# Patient Record
Sex: MALE | Race: WHITE | Employment: FULL TIME | URBAN - METROPOLITAN AREA
[De-identification: names, ages, dates, MRNs, and addresses within clinical notes are randomized per-mention and may not be internally consistent; named-entity substitution may affect disease eponyms.]

---

## 2020-04-19 ENCOUNTER — VIRTUAL VISIT (OUTPATIENT)
Dept: FAMILY MEDICINE | Facility: OTHER | Age: 50
End: 2020-04-19

## 2020-04-19 NOTE — PROGRESS NOTES
"Date: 2020 16:45:42  Clinician: Akanksha Robert  Clinician NPI: [id number]  Patient: Mikel Elias  Patient : 1970  Patient Address: [address] [place] [address]  Patient Phone: [phone]  Visit Protocol: URI  Patient Summary:  Mikel is a 50 year old ( : 1970 ) male who initiated a Visit for COVID-19 (Coronavirus) evaluation and screening. When asked the question \"Please sign me up to receive news, health information and promotions from [employer].\", Mikel responded \"No\".    Mikel states his symptoms started gradually 1 month or more ago. After his symptoms started, they improved and then got worse again.   His symptoms consist of a sore throat, a cough, malaise, myalgia, nausea, a headache, and wheezing. He is experiencing mild difficulty breathing with activities but can speak normally in full sentences. Mikel also feels feverish but was unable to measure his temperature.   Symptom details     Cough: Mikel coughs a few times an hour and his cough is not more bothersome at night. Phlegm does not come into his throat when he coughs. He does not believe his cough is caused by post-nasal drip.     Sore throat: Mikel reports having mild throat pain (1-3 on a 10 point pain scale), does not have exudate on his tonsils, and can swallow liquids. He is not sure if the lymph nodes in his neck are enlarged. A rash has not appeared on the skin since the sore throat started.     Wheezing: Mikel has not ever been diagnosed with asthma. The wheezing does not interfere with his normal daily activities.    Headache: Mikel has not had the headache for 12 weeks or more. He states the headache is mild (1-3 on a 10 point pain scale).      Mikel denies having rhinitis, chills, diarrhea, facial pain or pressure, nasal congestion, ear pain, vomiting, teeth pain, anosmia, and ageusia. He also denies taking antibiotic medication for the symptoms and having recent facial or sinus " surgery in the past 60 days.   Precipitating events  Within the past week, Mikel has not been exposed to someone with strep throat. He has not recently been exposed to someone with influenza. Mikel has been in close contact with the following high risk individuals: children under the age of 5 and people with asthma, heart disease or diabetes.   Pertinent COVID-19 (Coronavirus) information  Mikel has not traveled internationally or to the areas where COVID-19 (Coronavirus) is widespread, including cruise ship travel in the last 14 days before the start of his symptoms.   Mikel does not work or volunteer as healthcare worker or a  and does not work or volunteer in a healthcare facility.   He does not live with a healthcare worker.   Mikel has not had a close contact with a laboratory-confirmed COVID-19 patient within 14 days of symptom onset. He has had a close contact with a suspected COVID-19 patient within 14 days of symptom onset. Additional information about contact with COVID-19 (Coronavirus) patient as reported by the patient (free text): Coworkers with symptoms that didn't get tested so they wouldn't get quarantined   Pertinent medical history  Mikel had 1 sinus infection within the past year.   Mikel does not need a return to work/school note.   Weight: 140 lbs   Mikel does not smoke or use smokeless tobacco.   Weight: 140 lbs    MEDICATIONS: No current medications, ALLERGIES: NKDA  Clinician Response:  Dear Mikel,   Dear Mikel  Your symptoms show that you may have coronavirus (COVID-19). This illness can cause fever, cough and trouble breathing. Many people get a mild case and get better on their own. Some people can get very sick.  Will I be tested for COVID-19?  Because the virus is spreading, we are no longer testing most patients. You may request testing if:   You are very ill. For example, you're on chemotherapy, dialysis or home hospice care. (Contact your specialty  clinic or program.)   You live in a nursing home or other long-term care facility. (Talk to your nurse manager or medical director.)   You're a health care worker. (Contact your employee health office.)   How can I protect others?  Without a test, we can't know for sure that you have COVID-19. For safety, it's very important to follow these rules.  First, stay home and away from others (self-isolate) until:   You've had no fever---and no medicine that reduces fever---for 3 full days (72 hours). And...    Your other symptoms have gotten better. For example, your cough or breathing has improved. And...   At least 7 days have passed since your symptoms started.   During this time:   Don't go to work, school or anywhere else.    Stay away from others in your home. No hugging, kissing or shaking hands.   Don't let anyone visit.   Cover your mouth and nose with a mask, tissue or wash cloth to avoid spreading germs.   Wash your hands and face often. Use soap and water.   How can I take care of myself?   1.Take Tylenol (acetaminophen) for fever or pain. If you have liver or kidney problems, ask your family doctor if it's okay to take Tylenol.        Adults can take either:    650 mg (two 325 mg pills) every 4 to 6 hours, or...   1,000 mg (two 500 mg pills) every 8 hours as needed.    Note: Don't take more than 3,000 mg in one day.   For children, check the Tylenol bottle for the right dose. The dose is based on the child's age or weight.   2.If you have other health problems (like cancer, heart failure, an organ transplant or severe kidney disease): Call your specialty clinic if you don't feel better in the next 2 days.       3.Know when to call 911: If your breathing is so bad that it keeps you from doing normal activities, call 911 or go to the emergency room. Tell them that you've been staying home and may have COVID-19.       4.Sign up for [facility]. We know it's scary to hear that you might have COVID-19. We want to  track your symptoms to make sure you're okay over the next 2 weeks. Please look for an email from [employer]---this is a free, online program that we'll use to keep in touch. To sign up, follow the link in the email. Learn more at [email or web address].   Where can I get more information?  To learn more about COVID-19 and how to care for yourself at home, please visit the CDC website at https://www.cdc.gov/coronavirus/2019-ncov/about/steps-when-sick.html.  For more options for care at [facility], please visit our website at [email or web address]     Diagnosis: Cough  Diagnosis ICD: R05  Prescription: albuterol sulfate (Proventil HFA) 90 mcg/actuation inhalation HFA aerosol inhaler 1 200 inhalation aerosol with adapter (proventil hfa or equivalent), 14 days supply. Inhale 2 puffs every 4 hours for 14 days as needed. Refills: 0, Refill as needed: no, Allow substitutions: yes  Prescription: benzonatate (Tessalon Perles) 100 mg oral capsule 15 capsule, 5 days supply. Take 1 capsule by mouth 3 times per day for 5 days as needed. Refills: 0, Refill as needed: no, Allow substitutions: yes

## 2020-04-23 ENCOUNTER — TELEPHONE (OUTPATIENT)
Dept: FAMILY MEDICINE | Facility: CLINIC | Age: 50
End: 2020-04-23

## 2020-04-23 ENCOUNTER — VIRTUAL VISIT (OUTPATIENT)
Dept: URGENT CARE | Facility: CLINIC | Age: 50
End: 2020-04-23
Payer: COMMERCIAL

## 2020-04-23 VITALS — WEIGHT: 140 LBS

## 2020-04-23 DIAGNOSIS — R35.89 POLYURIA: ICD-10-CM

## 2020-04-23 DIAGNOSIS — Z20.822 SUSPECTED COVID-19 VIRUS INFECTION: Primary | ICD-10-CM

## 2020-04-23 PROBLEM — J30.2 SEASONAL ALLERGIC RHINITIS: Status: ACTIVE | Noted: 2020-04-23

## 2020-04-23 PROCEDURE — 99203 OFFICE O/P NEW LOW 30 MIN: CPT | Mod: 95 | Performed by: STUDENT IN AN ORGANIZED HEALTH CARE EDUCATION/TRAINING PROGRAM

## 2020-04-23 NOTE — PROGRESS NOTES
"The patient has been notified of following:     \"This telephone visit will be conducted via a call between you and your physician/provider. We have found that certain health care needs can be provided without the need for a physical exam.  This service lets us provide the care you need with a short phone conversation.  If a prescription is necessary we can send it directly to your pharmacy.      Telephone visits are billed at different rates depending on your insurance coverage. During this emergency period, for some insurers they may be billed the same as an in-person visit.  Please reach out to your insurance provider with any questions.    If during the course of the call the physician/provider feels a telephone visit is not appropriate, you will not be charged for this service.\"    Patient has given verbal consent for Telephone visit?  Yes      Subjective     CC: Mikel Elias  is a 50 year old male who presents to clinic today for the following health issues:   Chief Complaint   Patient presents with     Cough          History:  New symptom: lungs hurt. Started 5 weeks ago, started getting better. Then started getting \"a pain in my heart\", got very severe when he tried to ride his exercise bike for 10 s 4 weeks prior, took 2 days for that pain to go away. Feels a little winded with walking up stairs, which is new for him, but no chest pain. Has been the same for the last 3 weeks, but better than at symptom onset.  Now 5-6 weeks since symptom onset.  Had subjective fevers- resolved 3-4 weeks ago.  Interested in getting tested- either acute infection or the antibody testing. Would be interested in being in a clinical trial if possible.  Cough has been nonproductive without mucus. Cough had gone away, but now is back.  Still nauseous, especially in the AM, better with drinking water.  Initially had HA, myalgias, nausea.  Myalgias haven't gone away, just keep changing location.  Usually very active, does yoga, " works construction. Not usual to get aches.  Denies anosmia or lack of taste.  Is not taking any medication currently.    Has been back at work for the next 3 weeks.    Patient Active Problem List   Diagnosis     Seasonal allergic rhinitis     No current outpatient medications on file.     No current facility-administered medications for this visit.       Allergies no known allergies    Reviewed and updated as needed this visit by Provider    Review of Systems   Patient does endorse significant dry mouth and urinary frequency (10 or so per day) that did not resolve after marijuana cessation. Is worried this could be T2DM      Objective    Gen: Patient is alert, oriented  Resp: Speaking full sentence, no audible shortness of breath.  No cough of wheeze.    Wt 63.5 kg (140 lb)           Assessment/Plan:  Mikel was seen today for cough.    Diagnoses and all orders for this visit:    Suspected Covid-19 Virus Infection  -     COVID-19 GetWell Loop Referral; Future  Reassured patient tolerating activity reasonably well now, no concern for worsening respiratory status. No CAD hx to be concerned for undetected MI. Symptoms do seem to align with suspected prior COVID-19 infection, counseled patient it can take weeks before he feels completely back to normal.  Enrolled patient in [employer] for most up to date information.      Polyuria  Advised patient to establish with a primary clinic remotely for further investigation and possible labs to r/o T2DM, UTI, monitor lytes, etc. He will call a [facility] clinic close to him. If they are not taking new patients remotely, advised patient to call [facility].      Phone call duration:  30 minutes    DO [facility] Resident PGY-2  [phone]    04/23/204:08 PM    I was present during the key/critical portion of the telephone visit. The patient acknowledged that I participated in the telephone conversation. I discussed the case with the resident  and agree with the note as documented by the resident.    I personally spent a total of 3 minutes speaking with Mikel Elias during today s visit.     Bill Coronel MD  4/24/2020 at 11:38 AM

## 2020-04-23 NOTE — TELEPHONE ENCOUNTER
Patient stated, I scheduled phone visit on my own and still having cough and symptoms.     I am still having   Chest pain- harder to breath- someone standing on my chest for 6 weeks.- not worse than when he started  Cough   Not sure about fever- I do not feel like I have a fever     Per protocol and huddle- scheduled patient today for Virtual Urgent care with a urgent care provider. Phone  Visit with Bk august.     Evy Mcdonough RN on 4/23/2020 at 3:59 PM

## 2020-04-23 NOTE — PATIENT INSTRUCTIONS
1. Suspected COVID-19 Infection  I think you are getting better, but it will probably take weeks until you feel back to normal. Go easy on yourself.  Check your email to enroll in the [employer] service- it provides daily check ins and up to date information. That will likely be the quickest way to get accurate current info about what testing is available. As of right now, I can't offer acute infection testing to you, and I don't have antibody testing available.  As for trials, I checked our database and there are 4 trials currently, none of which you meet criteria for. They are only for 1) healthcare workers, 2) people with a prior positive lab test for COVID-19 3) people recently exposed to a positive COVID-19 patient, or 4) you are hospitalized and have symptoms suspicious for COVID-19.    2. Urinary frequency  Check the clinics near you by calling and see if you can establish care remotely for a PCP.  If none of them are available for that, call my home clinic to establish: [facility] [phone] at [address] [facility] [address]. We're doing phone and video visits with new patients and could order blood and urine tests from a lab close to you.  Based on your weight, I'm not highly suspicious for diabetes, but we should check some things out.    Take care,  Jacob Ramsay, DO

## 2020-04-23 NOTE — TELEPHONE ENCOUNTER
Patient has a telephone visit scheduled with Bk tomorrow, please triage for symptoms and find out if oncare wanted him seen in person or if a phone visit is okay or if this was patient request for appt? - per Bk.    Grecia Flores MA

## 2020-04-24 ENCOUNTER — OFFICE VISIT (OUTPATIENT)
Dept: FAMILY MEDICINE | Facility: CLINIC | Age: 50
End: 2020-04-24
Payer: COMMERCIAL

## 2020-04-24 VITALS
RESPIRATION RATE: 18 BRPM | TEMPERATURE: 98.5 F | DIASTOLIC BLOOD PRESSURE: 80 MMHG | OXYGEN SATURATION: 97 % | SYSTOLIC BLOOD PRESSURE: 120 MMHG | HEART RATE: 70 BPM

## 2020-04-24 DIAGNOSIS — J45.20 INTERMITTENT ASTHMA, UNSPECIFIED ASTHMA SEVERITY, UNSPECIFIED WHETHER COMPLICATED: ICD-10-CM

## 2020-04-24 DIAGNOSIS — Z20.822 SUSPECTED COVID-19 VIRUS INFECTION: Primary | ICD-10-CM

## 2020-04-24 PROCEDURE — 99204 OFFICE O/P NEW MOD 45 MIN: CPT | Performed by: NURSE PRACTITIONER

## 2020-04-24 RX ORDER — FLUTICASONE PROPIONATE AND SALMETEROL XINAFOATE 45; 21 UG/1; UG/1
2 AEROSOL, METERED RESPIRATORY (INHALATION) 2 TIMES DAILY
Qty: 1 INHALER | Refills: 1 | Status: SHIPPED | OUTPATIENT
Start: 2020-04-24 | End: 2020-07-01

## 2020-04-24 RX ORDER — ALBUTEROL SULFATE 90 UG/1
2 AEROSOL, METERED RESPIRATORY (INHALATION) EVERY 6 HOURS
Qty: 1 INHALER | Refills: 1 | Status: SHIPPED | OUTPATIENT
Start: 2020-04-24 | End: 2021-12-31

## 2020-04-24 NOTE — PROGRESS NOTES
Subjective     Mikel Elias is a 50 year old male who presents to clinic today for the following health issues:  PPE NOTE:  Prior to the patient entering the clinic he was provided with a surgical mask, he was escorted by an MA directly to room #1 from the clinic entrance (the MA was wearing an isolation mask).During the visit he provider wore full PPE including gown, glove, isolation mask, face shield. The patient did not go to any other areas of the clinic during the visit.  At the end of the visit the patient wore his mask and was escorted out of room #1 to the door.   HPI   Cough:    Chest Pain  Patient presents with ongoing cough. He had an oncare visit and telephone visit with urgent care triage yesterday.  He continues to have cough and shortness of breath.  He has had problems with shortness of breath since he was very sick in March. In March he had a fever and a severe cough.  Since that time he continues to have intermittent cough with shortness of breath with activity, cough is productive of white sputum.  He is concerned that he had COVID and his lungs are severely damaged.    Upon further discussion he reports that the intermittent episodes of coughing started 2 years ago and has used an albuterol inhaler in the past, the episode of coughing with wheeze was very bad in March and does not seem to be resolving.   Quit smoking cigarettes in 2009  Quit smoking mariguana in 3/2020.      Patient Active Problem List   Diagnosis     Seasonal allergic rhinitis     No past surgical history on file.    Social History     Tobacco Use     Smoking status: Former Smoker     Last attempt to quit: 2010     Years since quitting: 10.3     Smokeless tobacco: Never Used   Substance Use Topics     Alcohol use: Yes     Comment: daily cocktail after dinner     Family History   Problem Relation Age of Onset     Diabetes No family hx of          No current outpatient medications on file.     No Known Allergies    Reviewed and  updated as needed this visit by Provider         Review of Systems   ROS COMP: CONSTITUTIONAL: NEGATIVE for fever, chills, change in weight  ENT/MOUTH: NEGATIVE for ear, mouth and throat problems  RESP: see HPI  CV: NEGATIVE for chest pain, palpitations or peripheral edema  PSYCHIATRIC: NEGATIVE for changes in mood or affect      Objective    /80 (BP Location: Right arm, Patient Position: Chair, Cuff Size: Adult Regular)   Pulse 70   Temp 98.5  F (36.9  C)   Resp 18   SpO2 97%   There is no height or weight on file to calculate BMI.  Physical Exam   GENERAL: healthy, alert and no distress  HENT: ear canals and TM's normal, nose and mouth without ulcers or lesions  NECK: no adenopathy, no asymmetry, masses, or scars and thyroid normal to palpation  RESP: lungs clear to auscultation - no rales, rhonchi or wheezes, oxygen sat 97%, infrequent dry cough during exam.  CV: regular rate and rhythm, normal S1 S2,   NEURO:  mentation intact and speech normal  PSYCH: mentation appears normal, affect normal/bright          Assessment & Plan   Assessment  Mikel was seen today for cough.    Diagnoses and all orders for this visit:    Suspected Covid-19 Virus Infection:  Patient may have had COVID in March when had fever with more severe cough. Likely now is experiencing residual cough.      Intermittent asthma, unspecified asthma severity, unspecified whether complicated:  With intermittent cough for several years and history of smoking may have component of asthma, COPD. Will start on Advair bid , discussed taking daily, may use albuterol on an as needed basis (has had this prescribed in the past).  Also suggested OTC zyrtec.  -     fluticasone-salmeterol (ADVAIR-HFA) 45-21 MCG/ACT inhaler; Inhale 2 puffs into the lungs 2 times daily  -     albuterol (PROAIR HFA/PROVENTIL HFA/VENTOLIN HFA) 108 (90 Base) MCG/ACT inhaler; Inhale 2 puffs into the lungs every 6 hours    Follow up in 7/2020 for physical exam and recheck  of asthma, sooner as needed.      Susan Haase, APRN [facility]

## 2020-06-30 DIAGNOSIS — J45.20 INTERMITTENT ASTHMA, UNSPECIFIED ASTHMA SEVERITY, UNSPECIFIED WHETHER COMPLICATED: ICD-10-CM

## 2020-07-01 ENCOUNTER — VIRTUAL VISIT (OUTPATIENT)
Dept: FAMILY MEDICINE | Facility: CLINIC | Age: 50
End: 2020-07-01
Payer: COMMERCIAL

## 2020-07-01 ENCOUNTER — TELEPHONE (OUTPATIENT)
Dept: FAMILY MEDICINE | Facility: CLINIC | Age: 50
End: 2020-07-01

## 2020-07-01 DIAGNOSIS — Z20.822 ENCOUNTER FOR LABORATORY TESTING FOR COVID-19 VIRUS: ICD-10-CM

## 2020-07-01 DIAGNOSIS — J45.40 MODERATE PERSISTENT ASTHMA WITHOUT COMPLICATION: Primary | ICD-10-CM

## 2020-07-01 PROBLEM — J45.20 INTERMITTENT ASTHMA, UNSPECIFIED ASTHMA SEVERITY, UNSPECIFIED WHETHER COMPLICATED: Status: ACTIVE | Noted: 2020-07-01

## 2020-07-01 PROCEDURE — 99213 OFFICE O/P EST LOW 20 MIN: CPT | Mod: 95 | Performed by: NURSE PRACTITIONER

## 2020-07-01 RX ORDER — FLUTICASONE PROPIONATE AND SALMETEROL XINAFOATE 45; 21 UG/1; UG/1
AEROSOL, METERED RESPIRATORY (INHALATION)
Qty: 1 INHALER | Refills: 1 | OUTPATIENT
Start: 2020-07-01

## 2020-07-01 RX ORDER — FLUTICASONE PROPIONATE AND SALMETEROL XINAFOATE 45; 21 UG/1; UG/1
2 AEROSOL, METERED RESPIRATORY (INHALATION) 2 TIMES DAILY
Qty: 1 INHALER | Refills: 5 | Status: SHIPPED | OUTPATIENT
Start: 2020-07-01 | End: 2020-12-17

## 2020-07-01 ASSESSMENT — ASTHMA QUESTIONNAIRES
QUESTION_2 LAST FOUR WEEKS HOW OFTEN HAVE YOU HAD SHORTNESS OF BREATH: ONCE OR TWICE A WEEK
QUESTION_3 LAST FOUR WEEKS HOW OFTEN DID YOUR ASTHMA SYMPTOMS (WHEEZING, COUGHING, SHORTNESS OF BREATH, CHEST TIGHTNESS OR PAIN) WAKE YOU UP AT NIGHT OR EARLIER THAN USUAL IN THE MORNING: NOT AT ALL
QUESTION_4 LAST FOUR WEEKS HOW OFTEN HAVE YOU USED YOUR RESCUE INHALER OR NEBULIZER MEDICATION (SUCH AS ALBUTEROL): NOT AT ALL
QUESTION_1 LAST FOUR WEEKS HOW MUCH OF THE TIME DID YOUR ASTHMA KEEP YOU FROM GETTING AS MUCH DONE AT WORK, SCHOOL OR AT HOME: NONE OF THE TIME
ACT_TOTALSCORE: 23
QUESTION_5 LAST FOUR WEEKS HOW WOULD YOU RATE YOUR ASTHMA CONTROL: WELL CONTROLLED

## 2020-07-01 ASSESSMENT — ANXIETY QUESTIONNAIRES
2. NOT BEING ABLE TO STOP OR CONTROL WORRYING: NEARLY EVERY DAY
GAD7 TOTAL SCORE: 5
3. WORRYING TOO MUCH ABOUT DIFFERENT THINGS: NOT AT ALL
7. FEELING AFRAID AS IF SOMETHING AWFUL MIGHT HAPPEN: NOT AT ALL
1. FEELING NERVOUS, ANXIOUS, OR ON EDGE: NOT AT ALL
5. BEING SO RESTLESS THAT IT IS HARD TO SIT STILL: SEVERAL DAYS
6. BECOMING EASILY ANNOYED OR IRRITABLE: SEVERAL DAYS
IF YOU CHECKED OFF ANY PROBLEMS ON THIS QUESTIONNAIRE, HOW DIFFICULT HAVE THESE PROBLEMS MADE IT FOR YOU TO DO YOUR WORK, TAKE CARE OF THINGS AT HOME, OR GET ALONG WITH OTHER PEOPLE: NOT DIFFICULT AT ALL

## 2020-07-01 ASSESSMENT — PATIENT HEALTH QUESTIONNAIRE - PHQ9
5. POOR APPETITE OR OVEREATING: NOT AT ALL
SUM OF ALL RESPONSES TO PHQ QUESTIONS 1-9: 11

## 2020-07-01 NOTE — TELEPHONE ENCOUNTER
Pt is scheduled for a video visit this afternoon for refill on his Advair.  States his sx have improved.  Is interested in serology testing for COVID19.  Is not active on [employer] so ACT sent via mail.  Shireen Riley, CMA

## 2020-07-01 NOTE — LETTER
[facility]  [id number] [place] [address] [phone]  July 1, 2020    Mikel Elias  [address] [place] [address]    Dear Mikel,    I care about your health and have reviewed your health plan. I have reviewed your medical conditions, medication list, and lab results and am making recommendations based on this review, to better manage your health.    You are in particular need of attention regarding:  -Asthma  -Colon Cancer Screening    I am recommending that you:  {recommendations:-schedule a COLONOSCOPY to look for colon cancer (due every 10 years or 5 years in higher risk situations.)   Colon cancer is now the second leading cause of death in the [place] for both men and women and there are over 130,000 new cases and 50,000 deaths per year from colon cancer.  Colonoscopies can prevent 90-95% of these deaths.  Problem lesions can be removed before they ever become cancer.  This test is not only looking for cancer, but also getting rid of precancerious lesions.  If you do not wish to do a colonoscopy or cannot afford to do one, at this time, there is another option. It is called a FIT test or Fecal Immunochemical Occult Blood Test (take home stool sample kit).  It does not replace the colonoscopy for colorectal cancer screening, but it can detect hidden bleeding in the lower colon.  It does need to be repeated every year and if a positive result is obtained, you would be referred for a colonoscopy.  If you have completed either one of these tests at another facility, please have the records sent to our clinic so that we can best coordinate your care.   -Complete and return the attached ASTHMA CONTROL TEST.  If your total score is 19 or less or you have been to the ER or urgent care for your asthma, then please schedule an asthma followup appointment.    Here is a list of Health Maintenance topics that are due now or due  soon:  Health Maintenance Due   Topic Date Due     PREVENTIVE CARE VISIT  1970     ANNUAL REVIEW OF HM ORDERS  1970     ASTHMA CONTROL TEST  1970     COLORECTAL CANCER SCREENING  02/20/1980     HIV SCREENING  02/20/1985     DTAP/TDAP/TD IMMUNIZATION (1 - Tdap) 02/20/1995     LIPID  02/20/2005     PHQ-2  01/01/2020     ZOSTER IMMUNIZATION (1 of 2) 02/20/2020       Please call us at [phone] (or use [employer]) to address the above recommendations.     Thank you for trusting [facility] and we appreciate the opportunity to serve you.  We look forward to supporting your healthcare needs in the future.    Healthy Regards,    Susan Haase CNP

## 2020-07-01 NOTE — TELEPHONE ENCOUNTER
Please call and ask Pérez to set up a video visit with me to check on his cough/breathing status. Also to see if he would now like COVID testing. If he does want an appt see if you can get him an ACT to complete though [employer].  Thanks,  Susan Haase, CNP

## 2020-07-01 NOTE — TELEPHONE ENCOUNTER
Panel Management Review      Patient has the following on his problem list: None      Composite cancer screening  Chart review shows that this patient is due/due soon for the following Colonoscopy  Summary:    Patient is due/failing the following:   AAP, ACT and COLONOSCOPY    Action needed:   Patient needs to do ACT.    Type of outreach:    Copy of ACT mailed to patient, will reach out in 5 days.    Questions for provider review:    None                                                                                                                                    Shireen Riley CMA       Chart routed to Care Team .

## 2020-07-01 NOTE — PROGRESS NOTES
"Mikel Elias is a 50 year old male who is being evaluated via a billable telephone visit.      The patient has been notified of following:     \"This telephone visit will be conducted via a call between you and your physician/provider. We have found that certain health care needs can be provided without the need for a physical exam.  This service lets us provide the care you need with a short phone conversation.  If a prescription is necessary we can send it directly to your pharmacy.  If lab work is needed we can place an order for that and you can then stop by our lab to have the test done at a later time.    Telephone visits are billed at different rates depending on your insurance coverage. During this emergency period, for some insurers they may be billed the same as an in-person visit.  Please reach out to your insurance provider with any questions.    If during the course of the call the physician/provider feels a telephone visit is not appropriate, you will not be charged for this service.\"    Patient has given verbal consent for Telephone visit?  Yes    What phone number would you like to be contacted at? [phone]    How would you like to obtain your AVS? Mail a copy    Subjective     Mikel Elias is a 50 year old male who presents via phone visit today for the following health issues:    HPI  Asthma Follow-Up    Was ACT completed today?  Yes    ACT Total Scores 7/1/2020   ACT TOTAL SCORE (Goal Greater than or Equal to 20) 23   In the past 12 months, how many times did you visit the emergency room for your asthma without being admitted to the hospital? 0   In the past 12 months, how many times were you hospitalized overnight because of your asthma? 0       How many days per week do you miss taking your asthma controller medication?  1    Please describe any recent triggers for your asthma: pollens, humidity and exercise or sports    Have you had any Emergency Room Visits, Urgent Care Visits, or " Hospital Admissions since your last office visit?  No      How many servings of fruits and vegetables do you eat daily?  2-3    On average, how many sweetened beverages do you drink each day (Examples: soda, juice, sweet tea, etc.  Do NOT count diet or artificially sweetened beverages)?   1    How many days per week do you exercise enough to make your heart beat faster? 7    How many minutes a day do you exercise enough to make your heart beat faster? 60 or more  How many days per week do you miss taking your medication? 1    What makes it hard for you to take your medications?      Last visit 4/24/2020, prescribed Advair 2 puffs bid and albuterol inhaler.    Taking benadryl at night as needed.     PHQ 9 score of 11, FAHEEM 7 score of  5.  Denies thoughts of harming self or others.     Patient Active Problem List   Diagnosis     Seasonal allergic rhinitis     No past surgical history on file.    Social History     Tobacco Use     Smoking status: Former Smoker     Last attempt to quit: 2010     Years since quitting: 10.5     Smokeless tobacco: Never Used   Substance Use Topics     Alcohol use: Yes     Comment: couple nights per week      Family History   Problem Relation Age of Onset     Diabetes No family hx of          Current Outpatient Medications   Medication Sig Dispense Refill     albuterol (PROAIR HFA/PROVENTIL HFA/VENTOLIN HFA) 108 (90 Base) MCG/ACT inhaler Inhale 2 puffs into the lungs every 6 hours 1 Inhaler 1     fluticasone-salmeterol (ADVAIR-HFA) 45-21 MCG/ACT inhaler Inhale 2 puffs into the lungs 2 times daily 1 Inhaler 1     Allergies   Allergen Reactions     Seasonal Allergies      BP Readings from Last 3 Encounters:   04/24/20 120/80    Wt Readings from Last 3 Encounters:   04/23/20 63.5 kg (140 lb)      Reviewed and updated as needed this visit by Provider         Review of Systems   CONSTITUTIONAL: NEGATIVE for fever, chills, change in weight  ENT/MOUTH: NEGATIVE for ear, mouth and throat  problems  RESP: NEGATIVE for significant cough or SOB  CV: NEGATIVE for chest pain, palpitations or peripheral edema  PSYCHIATRIC: NEGATIVE for changes in mood or affect       Objective   Reported vitals:  There were no vitals taken for this visit.     PSYCH: Alert and oriented times 3; coherent speech, normal   rate and volume, able to articulate logical thoughts, able   to abstract reason, no tangential thoughts, no hallucinations   or delusions    RESP: No cough, no audible wheezing, able to talk in full sentences  Remainder of exam unable to be completed due to telephone visits        Assessment/Plan:  Mikel was seen today for asthma.    Diagnoses and all orders for this visit:    Moderate persistent asthma without complication:  ACT of 23, well controlled.    -     fluticasone-salmeterol (ADVAIR-HFA) 45-21 MCG/ACT inhaler; Inhale 2 puffs into the lungs 2 times daily    Encounter for laboratory testing for COVID-19 virus  -     COVID-19 Virus (Coronavirus) Antibody & Titer Reflex; Future    Follow up in 1-2 months for physical exam, sooner as needed.       Phone call duration:  8 minutes    Susan Haase, CNP

## 2020-07-02 ASSESSMENT — ASTHMA QUESTIONNAIRES: ACT_TOTALSCORE: 23

## 2020-07-02 ASSESSMENT — ANXIETY QUESTIONNAIRES: GAD7 TOTAL SCORE: 5

## 2020-07-03 DIAGNOSIS — Z20.822 ENCOUNTER FOR LABORATORY TESTING FOR COVID-19 VIRUS: ICD-10-CM

## 2020-07-03 PROCEDURE — 86769 SARS-COV-2 COVID-19 ANTIBODY: CPT | Mod: 90 | Performed by: NURSE PRACTITIONER

## 2020-07-03 PROCEDURE — 99000 SPECIMEN HANDLING OFFICE-LAB: CPT | Performed by: NURSE PRACTITIONER

## 2020-07-03 PROCEDURE — 36415 COLL VENOUS BLD VENIPUNCTURE: CPT | Performed by: NURSE PRACTITIONER

## 2020-07-07 LAB
COVID-19 SPIKE RBD ABY TITER: NORMAL
COVID-19 SPIKE RBD ABY: NEGATIVE

## 2020-07-08 NOTE — RESULT ENCOUNTER NOTE
Hi Pérez,  Your test for COVID is negative, you have not had COVID in the past.  Sincerely,     Susan Haase, CNP

## 2020-07-14 ASSESSMENT — ASTHMA QUESTIONNAIRES: ACT_TOTALSCORE: 22

## 2020-08-21 ENCOUNTER — VIRTUAL VISIT (OUTPATIENT)
Dept: FAMILY MEDICINE | Facility: OTHER | Age: 50
End: 2020-08-21
Payer: COMMERCIAL

## 2020-08-21 DIAGNOSIS — Z20.822 SUSPECTED COVID-19 VIRUS INFECTION: Primary | ICD-10-CM

## 2020-08-21 PROCEDURE — U0003 INFECTIOUS AGENT DETECTION BY NUCLEIC ACID (DNA OR RNA); SEVERE ACUTE RESPIRATORY SYNDROME CORONAVIRUS 2 (SARS-COV-2) (CORONAVIRUS DISEASE [COVID-19]), AMPLIFIED PROBE TECHNIQUE, MAKING USE OF HIGH THROUGHPUT TECHNOLOGIES AS DESCRIBED BY CMS-2020-01-R: HCPCS | Performed by: FAMILY MEDICINE

## 2020-08-21 PROCEDURE — 99421 OL DIG E/M SVC 5-10 MIN: CPT | Performed by: NURSE PRACTITIONER

## 2020-08-21 NOTE — PROGRESS NOTES
"Date: 2020 09:43:44  Clinician: Daya Wright  Clinician NPI: [id number]  Patient: Mikel Elias  Patient : 1970  Patient Address: [address] [place] [address]  Patient Phone: [phone]  Visit Protocol: URI  Patient Summary:  Mikel is a 50 year old ( : 1970 ) male who initiated a Visit for COVID-19 (Coronavirus) evaluation and screening. When asked the question \"Please sign me up to receive news, health information and promotions from [employer].\", Mikel responded \"No\".    Mikel states his symptoms started gradually 7-9 days ago.   His symptoms consist of a headache, malaise, wheezing, ageusia, diarrhea, a cough, nasal congestion, rhinitis, nausea, myalgia, and anosmia. He is experiencing mild difficulty breathing with activities but can speak normally in full sentences. Mikel also feels feverish.   Symptom details     Nasal secretions: The color of his mucus is clear.    Cough: Mikel coughs a few times an hour and his cough is not more bothersome at night. Phlegm does not come into his throat when he coughs. He does not believe his cough is caused by post-nasal drip.     Temperature: His current temperature is 99.4 degrees Fahrenheit.     Wheezing: Mikel has been diagnosed with asthma. Additional wheezing details as reported by the patient (free text): It's a mild wheeze       Headache: He states the headache is mild (1-3 on a 10 point pain scale).      Mikel denies having ear pain, chills, sore throat, teeth pain, vomiting, and facial pain or pressure. He also denies having recent facial or sinus surgery in the past 60 days, taking antibiotic medication in the past month, and double sickening (worsening symptoms after initial improvement).   Precipitating events  He has not recently been exposed to someone with influenza. Mikel has not been in close contact with any high risk individuals.   Pertinent COVID-19 (Coronavirus) information  In the past 14 days, " Mikel has not worked in a congregate living setting.   He does not work or volunteer as healthcare worker or a  and does not work or volunteer in a healthcare facility.   Mikel also has not lived in a congregate living setting in the past 14 days. He does not live with a healthcare worker.   Mikel has had a close contact with a laboratory-confirmed COVID-19 patient within 14 days of symptom onset.   Since December 2019, Mikel and has had upper respiratory infection (URI) or influenza-like illness. Has not been diagnosed with lab-confirmed COVID-19 test      Date(s) of previous URI or influenza-like illness (free-text): Late February through March     Symptoms Mikel experienced during previous URI or influenza-like illness as reported by the patient (free-text): Pneumonia like symptoms        Pertinent medical history  Mikel had 1 sinus infection within the past year.   Mikel needs a return to work/school note.   Weight: 140 lbs   Mikel does not smoke or use smokeless tobacco.   Weight: 140 lbs    MEDICATIONS: fluticasone propionate-salmeterol xinafoate inhalation, ALLERGIES: NKDA  Clinician Response:  Dear Mikel,      I ave ordered your test below, but please go to the ER immediately if your wheezing or breathing worsens in any way.  Your symptoms show that you may have coronavirus (COVID-19). This&nbsp;illness can cause fever, cough and trouble breathing. Many people get a mild case and get better on their own. Some people can get very sick.  What should I do?  We would like to test you for this virus.  1. Please call [phone] to schedule your visit. Explain that you were referred by [facility] to have a COVID-19 test. Be ready to share your [facility] visit ID number.  The following will serve as your written order for this COVID Test, ordered by me, for the indication of suspected COVID [Z20.828]: The test will be ordered in [employer], our electronic health record, after you are scheduled.  "It will show as ordered and authorized by Lazaro Gillespie MD.  Order: COVID-19 (Coronavirus) PCR for SYMPTOMATIC testing from [facility].    2. When it's time for your COVID test:  Stay at least 6 feet away from others. (If someone will drive you to your test, stay in the backseat, as far away from the  as you can.)  Cover your mouth and nose with a mask, tissue or washcloth.  Go straight to the testing site. Don't make any stops on the way there or back.    3.Starting now:&nbsp;Stay home and away from others (self-isolate) until:   You've had&nbsp;no&nbsp;fever---and no medicine that reduces fever---for one full day (24 hours).&nbsp;And...  Your other symptoms have gotten better. For example, your cough or breathing has improved.&nbsp;And...  At least&nbsp;10 days&nbsp;have passed since your symptoms started.    During this time, don't leave the house except for testing or medical care.   Stay in your own room, even for meals. Use your own bathroom if you can.  Stay away from others in your home. No hugging, kissing or shaking hands. No visitors.  Don't go to work, school or anywhere else.   Clean \"high touch\" surfaces often (doorknobs, counters, handles, etc.). Use a household cleaning spray or wipes. You'll find a full list of  on the EPA website:&nbsp;www.epa.gov/pesticide-registration/list-n-disinfectants-use-against-sars-cov-2.   Cover your mouth and nose with a mask, tissue or washcloth to avoid spreading germs.  Wash your hands and face often. Use soap and water.  Caregivers in these groups are at risk for severe illness due to COVID-19:  o People 65 years and older  o People who live in a nursing home or long-term care facility  o People with chronic disease (lung, heart, cancer, diabetes, kidney, liver, immunologic)  o People who have a weakened immune system, including those who:   Are in cancer treatment  Take medicine that weakens the immune system, such as corticosteroids  Had a bone marrow or " organ transplant  Have an immune deficiency  Have poorly controlled HIV or AIDS  Are obese (body mass index of 40 or higher)  Smoke regularly   o Caregivers should wear gloves while washing dishes, handling laundry and cleaning bedrooms and bathrooms.  o Use caution when washing and drying laundry: Don't shake dirty laundry, and use the warmest water setting that you can.  o For more tips, go to&nbsp;www.cdc.gov/coronavirus/2019-ncov/downloads/10Things.pdf.   How can I take care of myself?    Get lots of rest. Drink extra fluids&nbsp;(unless a doctor has told you not to).  Take Tylenol (acetaminophen) for fever or pain.&nbsp;If you have liver or kidney problems, ask your family doctor if it's okay to take Tylenol.   Adults can take either:   650 mg (two 325 mg pills) every 4 to 6 hours,&nbsp;or...  1,000 mg (two 500 mg pills) every 8 hours as needed.  Note:&nbsp;Don't take more than 3,000 mg in one day. Acetaminophen is found in many medicines (both prescribed and over-the-counter medicines). Read all labels to be sure you don't take too much.   For children, check the Tylenol bottle for the right dose. The dose is based on the child's age or weight.   If you have other health problems (like cancer, heart failure, an organ transplant or severe kidney disease):&nbsp;Call your specialty clinic if you don't feel better in the next 2 days.    Know when to call 911.&nbsp;Emergency warning signs include:   Trouble breathing or shortness of breath Pain or pressure in the chest that doesn't go away Feeling confused like you haven't felt before, or not being able to wake up Bluish-colored lips or face.  Where can I get more information?   [facility] -- About COVID-19:&nbsp;[email or web address]  CDC -- What to Do If You're Sick:&nbsp;www.cdc.gov/coronavirus/2019-ncov/about/steps-when-sick.html  CDC -- Ending Home Isolation:&nbsp;www.cdc.gov/coronavirus/2019-ncov/hcp/disposition-in-home-patients.html  CDC --  Caring for Someone:&nbsp;www.cdc.gov/coronavirus/2019-ncov/if-you-are-sick/care-for-someone.html  [facility] -- Interim Guidance for Hospital Discharge to Home:&nbsp;www.health.[facility].[place].us/diseases/coronavirus/hcp/hospdischarge.pdf  [facility] clinical trials (COVID-19 research studies):&nbsp;clinicalaffairs.[facility]-clinical-trials  Below are the COVID-19 hotlines at the [place] [facility] ([facility]). Interpreters are available.   For health questions: Call 584-443-9647 or 1-857.413.6066 (7 a.m. to 7 p.m.) For questions about schools and childcare: Call 758-053-5643 or 1-721.451.1130 (7 a.m. to 7 p.m.)           Diagnosis: Cough  Diagnosis ICD: R05

## 2020-08-22 LAB
SARS-COV-2 RNA SPEC QL NAA+PROBE: NOT DETECTED
SPECIMEN SOURCE: NORMAL

## 2020-12-17 DIAGNOSIS — J45.40 MODERATE PERSISTENT ASTHMA WITHOUT COMPLICATION: ICD-10-CM

## 2020-12-17 RX ORDER — FLUTICASONE PROPIONATE AND SALMETEROL XINAFOATE 45; 21 UG/1; UG/1
AEROSOL, METERED RESPIRATORY (INHALATION)
Qty: 3 INHALER | Refills: 0 | Status: SHIPPED | OUTPATIENT
Start: 2020-12-17 | End: 2021-03-16

## 2020-12-17 NOTE — TELEPHONE ENCOUNTER
Prescription approved per [facility] Refill Protocol.  Anshu Fontana RN, BSN

## 2021-01-04 ENCOUNTER — HEALTH MAINTENANCE LETTER (OUTPATIENT)
Age: 51
End: 2021-01-04

## 2021-03-15 DIAGNOSIS — J45.40 MODERATE PERSISTENT ASTHMA WITHOUT COMPLICATION: ICD-10-CM

## 2021-03-16 RX ORDER — FLUTICASONE PROPIONATE AND SALMETEROL XINAFOATE 45; 21 UG/1; UG/1
AEROSOL, METERED RESPIRATORY (INHALATION)
Qty: 12 G | Refills: 0 | Status: SHIPPED | OUTPATIENT
Start: 2021-03-16 | End: 2021-04-09

## 2021-03-16 NOTE — TELEPHONE ENCOUNTER
Routing refill request to provider for review/approval because:  Patient needs to be seen because:  Failing visit  Failing ACT    Patt Juárez RN

## 2021-03-16 NOTE — TELEPHONE ENCOUNTER
Pérez needs a visit, I last did a virtual visit with him in 7/2020 and he was going to follow up in 1-2 months for a clinic visit, I have not seen him...I placed a refill of his controller inhaler, no further refills of this inhaler without a visit. Please ask if he is still getting care here?  THanks,  Susan Haase, CNP

## 2021-04-09 DIAGNOSIS — J45.40 MODERATE PERSISTENT ASTHMA WITHOUT COMPLICATION: ICD-10-CM

## 2021-04-09 RX ORDER — FLUTICASONE PROPIONATE AND SALMETEROL XINAFOATE 45; 21 UG/1; UG/1
AEROSOL, METERED RESPIRATORY (INHALATION)
Qty: 12 G | Refills: 0 | Status: SHIPPED | OUTPATIENT
Start: 2021-04-09 | End: 2021-12-31

## 2021-04-09 NOTE — TELEPHONE ENCOUNTER
Medication is being filled for 1 time refill only due to:  Patient needs labs ACT. Patient needs to be seen because due for f/u.   Routing to MA/TC pool. The Pt is due for a visit with PCP. Please call and help them schedule.    RN will issue a 90 day supply. No further refills until the Pt is seen.     Jeancarlos MIMS RN

## 2021-05-18 ENCOUNTER — OFFICE VISIT (OUTPATIENT)
Dept: FAMILY MEDICINE | Facility: CLINIC | Age: 51
End: 2021-05-18
Payer: COMMERCIAL

## 2021-05-18 VITALS
HEIGHT: 64 IN | DIASTOLIC BLOOD PRESSURE: 70 MMHG | OXYGEN SATURATION: 98 % | BODY MASS INDEX: 24.96 KG/M2 | SYSTOLIC BLOOD PRESSURE: 108 MMHG | HEART RATE: 72 BPM | TEMPERATURE: 98.4 F | RESPIRATION RATE: 16 BRPM | WEIGHT: 146.2 LBS

## 2021-05-18 DIAGNOSIS — Z13.220 SCREENING FOR HYPERLIPIDEMIA: ICD-10-CM

## 2021-05-18 DIAGNOSIS — Z23 NEED FOR TDAP VACCINATION: ICD-10-CM

## 2021-05-18 DIAGNOSIS — Z11.59 NEED FOR HEPATITIS C SCREENING TEST: ICD-10-CM

## 2021-05-18 DIAGNOSIS — I34.1 MITRAL VALVE PROLAPSE: ICD-10-CM

## 2021-05-18 DIAGNOSIS — R42 DIZZINESS: ICD-10-CM

## 2021-05-18 DIAGNOSIS — J45.40 MODERATE PERSISTENT ASTHMA WITHOUT COMPLICATION: Primary | ICD-10-CM

## 2021-05-18 DIAGNOSIS — R68.82 LOW LIBIDO: ICD-10-CM

## 2021-05-18 DIAGNOSIS — J30.1 SEASONAL ALLERGIC RHINITIS DUE TO POLLEN: ICD-10-CM

## 2021-05-18 DIAGNOSIS — Z12.11 SCREEN FOR COLON CANCER: ICD-10-CM

## 2021-05-18 DIAGNOSIS — Z11.4 SCREENING FOR HIV (HUMAN IMMUNODEFICIENCY VIRUS): ICD-10-CM

## 2021-05-18 PROCEDURE — 99214 OFFICE O/P EST MOD 30 MIN: CPT | Mod: 25 | Performed by: NURSE PRACTITIONER

## 2021-05-18 PROCEDURE — 90715 TDAP VACCINE 7 YRS/> IM: CPT | Performed by: NURSE PRACTITIONER

## 2021-05-18 PROCEDURE — 90732 PPSV23 VACC 2 YRS+ SUBQ/IM: CPT | Performed by: NURSE PRACTITIONER

## 2021-05-18 PROCEDURE — 90472 IMMUNIZATION ADMIN EACH ADD: CPT | Performed by: NURSE PRACTITIONER

## 2021-05-18 PROCEDURE — 90471 IMMUNIZATION ADMIN: CPT | Performed by: NURSE PRACTITIONER

## 2021-05-18 RX ORDER — FLUTICASONE PROPIONATE AND SALMETEROL XINAFOATE 115; 21 UG/1; UG/1
2 AEROSOL, METERED RESPIRATORY (INHALATION) 2 TIMES DAILY
Qty: 12 G | Refills: 11 | Status: SHIPPED | OUTPATIENT
Start: 2021-05-18 | End: 2021-12-31

## 2021-05-18 ASSESSMENT — ANXIETY QUESTIONNAIRES
7. FEELING AFRAID AS IF SOMETHING AWFUL MIGHT HAPPEN: SEVERAL DAYS
GAD7 TOTAL SCORE: 6
3. WORRYING TOO MUCH ABOUT DIFFERENT THINGS: SEVERAL DAYS
1. FEELING NERVOUS, ANXIOUS, OR ON EDGE: SEVERAL DAYS
5. BEING SO RESTLESS THAT IT IS HARD TO SIT STILL: SEVERAL DAYS
2. NOT BEING ABLE TO STOP OR CONTROL WORRYING: NOT AT ALL
6. BECOMING EASILY ANNOYED OR IRRITABLE: SEVERAL DAYS
IF YOU CHECKED OFF ANY PROBLEMS ON THIS QUESTIONNAIRE, HOW DIFFICULT HAVE THESE PROBLEMS MADE IT FOR YOU TO DO YOUR WORK, TAKE CARE OF THINGS AT HOME, OR GET ALONG WITH OTHER PEOPLE: NOT DIFFICULT AT ALL

## 2021-05-18 ASSESSMENT — MIFFLIN-ST. JEOR: SCORE: 1429.16

## 2021-05-18 ASSESSMENT — PATIENT HEALTH QUESTIONNAIRE - PHQ9
5. POOR APPETITE OR OVEREATING: SEVERAL DAYS
SUM OF ALL RESPONSES TO PHQ QUESTIONS 1-9: 5

## 2021-05-18 NOTE — PROGRESS NOTES
"    Assessment & Plan     Moderate persistent asthma without complication: ACT of 19, will change Advair concentration to 115/21. Sent home with an ACT, will call in 4 weeks to recheck.      - fluticasone-salmeterol (ADVAIR-HFA) 115-21 MCG/ACT inhaler; Inhale 2 puffs into the lungs 2 times daily  - VACCINE ADMINISTRATION, EACH ADDITIONAL    Seasonal allergic rhinitis due to pollen: discussed trying Claritin or zyrtec OTC  - Comprehensive metabolic panel; Future    Mitral valve prolapse:  Reports he was diagnosed with this in [place] at [facility], checked in Care everywhere no history available for him.  Also sibling recently diagnosed with CAD.   - CARDIOLOGY EVAL ADULT REFERRAL; Future    Dizziness: occurs when he hasn't had something to eat.   - Hemoglobin A1c; Future    Low libido: will return for testosterone level, he will also send me information regarding the OTC med he is taking.   - **Testosterone Free and Total FUTURE anytime; Future    Screen for colon cancer  - Fecal colorectal cancer screen FIT - Future (S+30); Future    Screening for HIV (human immunodeficiency virus)  - HIV Antigen Antibody Combo; Future    Need for hepatitis C screening test  - Hepatitis C Screen Reflex to HCV RNA Quant and Genotype; Future    Screening for hyperlipidemia  - Lipid panel reflex to direct LDL Fasting; Future  - CBC with platelets differential; Future    Need for Tdap vaccination  - VACCINE ADMINISTRATION, INITIAL       BMI:   Estimated body mass index is 25.1 kg/m  as calculated from the following:    Height as of this encounter: 1.626 m (5' 4\").    Weight as of this encounter: 66.3 kg (146 lb 3.2 oz).       Return in about 6 months (around 11/18/2021) for Routine Visit.    Susan Haase, APRN [facility] PHUONG Ortez is a 51 year old who presents for the following health issues     History of Present Illness     Asthma:  He presents for follow up of asthma.  He has no cough, some wheezing, " and some shortness of breath. He is not using a relief medication. He does not miss any doses of his controller medication throughout the week.Patient is aware of the following triggers: same as previous visit. The patient has not had a visit to the Emergency Room, Urgent Care or Hospital due to asthma since the last clinic visit.     Diabetes:   He presents for follow up of diabetes.  He is not checking blood glucose. He is concerned about low blood sugar, several less than 70 in the past few weeks and other.  He is having numbness in feet, excessive thirst, blurry vision, weight loss and weight gain.         Vascular Disease:  He presents for follow up of vascular disease.  He never takes nitroglycerin. He is not taking daily aspirin.    He eats 2-3 servings of fruits and vegetables daily.He consumes 2 sweetened beverage(s) daily.He exercises with enough effort to increase his heart rate 9 or less minutes per day.  He exercises with enough effort to increase his heart rate 3 or less days per week.   He is taking medications regularly.     ACT of 19, reports some wheezing, denies cough. Has stopped use of marijuana, non smoker. Using advair 2 puffs twice a day.  Very rarely uses albuterol. Has seasonal allergies, decongestants cause increase in sleepiness. Nonsmoker.     Mitral Valve Prolapse:  Diagnosed when at [facility] in 1990, his brother just had an MI so he is concerned about this heart.  Denies chest pain, palpitations, dyspnea on exertion, orthopnea, BLE edema.      Insomnia:  Difficulty with falling asleep.  CBD gummies have helped. Sleeping 4-5 hours.     Blood sugar issues/dizziness:  Would like testing for diabetes and low blood sugar. Feels irritable when he needs to eat. Denies family history of diabetes.       Low libido:  Is concerned about his Testosterone level being low.  Is taking an OTC supplement that has been helpful, unsure if he should take long term.       Mitral valve prolapse:  Last 1990.  "    Review of Systems   CONSTITUTIONAL: NEGATIVE for fever, chills, change in weight  ENT/MOUTH: NEGATIVE for ear, mouth and throat problems  RESP: NEGATIVE for significant cough or SOB. See HPI  CV: NEGATIVE for chest pain, palpitations or peripheral edema  ENDOCRINE: NEGATIVE for temperature intolerance, skin/hair changes  PSYCHIATRIC: see HPI      Objective    /70 (BP Location: Right arm, Patient Position: Chair, Cuff Size: Adult Regular)   Pulse 72   Temp 98.4  F (36.9  C) (Oral)   Resp 16   Ht 1.626 m (5' 4\")   Wt 66.3 kg (146 lb 3.2 oz)   SpO2 98%   BMI 25.10 kg/m    Body mass index is 25.1 kg/m .  Physical Exam   GENERAL: healthy, alert and no distress  HENT: ear canals and TM's normal, nose and mouth without ulcers or lesions  NECK: no adenopathy, no asymmetry, masses, or scars and thyroid normal to palpation  RESP: lungs clear to auscultation - no rales, rhonchi or wheezes  CV: regular rate and rhythm, normal S1 S2, no S3 or S4, no murmur, click or rub, no peripheral edema and peripheral pulses strong  PSYCH: mentation appears normal, affect normal/bright                "

## 2021-05-18 NOTE — Clinical Note
Please call in 4 weeks to repeat ACT, he was sent home with an ACT copy.  Thanks,!  Susan Haase, CNP

## 2021-05-19 ASSESSMENT — ANXIETY QUESTIONNAIRES: GAD7 TOTAL SCORE: 6

## 2021-05-19 ASSESSMENT — ASTHMA QUESTIONNAIRES: ACT_TOTALSCORE: 19

## 2021-05-28 DIAGNOSIS — Z13.220 SCREENING FOR HYPERLIPIDEMIA: ICD-10-CM

## 2021-05-28 DIAGNOSIS — Z11.4 SCREENING FOR HIV (HUMAN IMMUNODEFICIENCY VIRUS): ICD-10-CM

## 2021-05-28 DIAGNOSIS — J30.1 SEASONAL ALLERGIC RHINITIS DUE TO POLLEN: ICD-10-CM

## 2021-05-28 DIAGNOSIS — Z12.11 SCREEN FOR COLON CANCER: ICD-10-CM

## 2021-05-28 DIAGNOSIS — R68.82 LOW LIBIDO: ICD-10-CM

## 2021-05-28 DIAGNOSIS — R42 DIZZINESS: ICD-10-CM

## 2021-05-28 DIAGNOSIS — Z11.59 NEED FOR HEPATITIS C SCREENING TEST: ICD-10-CM

## 2021-05-28 LAB
ALBUMIN SERPL-MCNC: 3.9 G/DL (ref 3.4–5)
ALP SERPL-CCNC: 57 U/L (ref 40–150)
ALT SERPL W P-5'-P-CCNC: 34 U/L (ref 0–70)
ANION GAP SERPL CALCULATED.3IONS-SCNC: 3 MMOL/L (ref 3–14)
AST SERPL W P-5'-P-CCNC: 21 U/L (ref 0–45)
BASOPHILS # BLD AUTO: 0 10E9/L (ref 0–0.2)
BASOPHILS NFR BLD AUTO: 0.5 %
BILIRUB SERPL-MCNC: 0.4 MG/DL (ref 0.2–1.3)
BUN SERPL-MCNC: 14 MG/DL (ref 7–30)
CALCIUM SERPL-MCNC: 8.7 MG/DL (ref 8.5–10.1)
CHLORIDE SERPL-SCNC: 104 MMOL/L (ref 94–109)
CHOLEST SERPL-MCNC: 212 MG/DL
CO2 SERPL-SCNC: 30 MMOL/L (ref 20–32)
CREAT SERPL-MCNC: 0.82 MG/DL (ref 0.66–1.25)
DIFFERENTIAL METHOD BLD: NORMAL
EOSINOPHIL # BLD AUTO: 0.1 10E9/L (ref 0–0.7)
EOSINOPHIL NFR BLD AUTO: 2 %
ERYTHROCYTE [DISTWIDTH] IN BLOOD BY AUTOMATED COUNT: 12.2 % (ref 10–15)
GFR SERPL CREATININE-BSD FRML MDRD: >90 ML/MIN/{1.73_M2}
GLUCOSE SERPL-MCNC: 95 MG/DL (ref 70–99)
HBA1C MFR BLD: 5.3 % (ref 0–5.6)
HCT VFR BLD AUTO: 41.3 % (ref 40–53)
HCV AB SERPL QL IA: NONREACTIVE
HDLC SERPL-MCNC: 60 MG/DL
HEMOCCULT STL QL IA: NEGATIVE
HGB BLD-MCNC: 14.4 G/DL (ref 13.3–17.7)
HIV 1+2 AB+HIV1 P24 AG SERPL QL IA: NONREACTIVE
LDLC SERPL CALC-MCNC: 121 MG/DL
LYMPHOCYTES # BLD AUTO: 2.1 10E9/L (ref 0.8–5.3)
LYMPHOCYTES NFR BLD AUTO: 36.1 %
MCH RBC QN AUTO: 32.1 PG (ref 26.5–33)
MCHC RBC AUTO-ENTMCNC: 34.9 G/DL (ref 31.5–36.5)
MCV RBC AUTO: 92 FL (ref 78–100)
MONOCYTES # BLD AUTO: 0.5 10E9/L (ref 0–1.3)
MONOCYTES NFR BLD AUTO: 8 %
NEUTROPHILS # BLD AUTO: 3.1 10E9/L (ref 1.6–8.3)
NEUTROPHILS NFR BLD AUTO: 53.4 %
NONHDLC SERPL-MCNC: 152 MG/DL
PLATELET # BLD AUTO: 319 10E9/L (ref 150–450)
POTASSIUM SERPL-SCNC: 3.9 MMOL/L (ref 3.4–5.3)
PROT SERPL-MCNC: 7.2 G/DL (ref 6.8–8.8)
RBC # BLD AUTO: 4.49 10E12/L (ref 4.4–5.9)
SODIUM SERPL-SCNC: 137 MMOL/L (ref 133–144)
TRIGL SERPL-MCNC: 156 MG/DL
WBC # BLD AUTO: 5.9 10E9/L (ref 4–11)

## 2021-05-28 PROCEDURE — 80061 LIPID PANEL: CPT | Performed by: NURSE PRACTITIONER

## 2021-05-28 PROCEDURE — 84270 ASSAY OF SEX HORMONE GLOBUL: CPT | Performed by: NURSE PRACTITIONER

## 2021-05-28 PROCEDURE — 84403 ASSAY OF TOTAL TESTOSTERONE: CPT | Mod: 90 | Performed by: NURSE PRACTITIONER

## 2021-05-28 PROCEDURE — 36415 COLL VENOUS BLD VENIPUNCTURE: CPT | Performed by: NURSE PRACTITIONER

## 2021-05-28 PROCEDURE — 85025 COMPLETE CBC W/AUTO DIFF WBC: CPT | Performed by: NURSE PRACTITIONER

## 2021-05-28 PROCEDURE — 80053 COMPREHEN METABOLIC PANEL: CPT | Performed by: NURSE PRACTITIONER

## 2021-05-28 PROCEDURE — 86803 HEPATITIS C AB TEST: CPT | Performed by: NURSE PRACTITIONER

## 2021-05-28 PROCEDURE — 82274 ASSAY TEST FOR BLOOD FECAL: CPT | Performed by: NURSE PRACTITIONER

## 2021-05-28 PROCEDURE — 87389 HIV-1 AG W/HIV-1&-2 AB AG IA: CPT | Performed by: NURSE PRACTITIONER

## 2021-05-28 PROCEDURE — 83036 HEMOGLOBIN GLYCOSYLATED A1C: CPT | Performed by: NURSE PRACTITIONER

## 2021-05-28 PROCEDURE — 99000 SPECIMEN HANDLING OFFICE-LAB: CPT | Performed by: NURSE PRACTITIONER

## 2021-05-29 LAB
SHBG SERPL-SCNC: 47 NMOL/L (ref 11–80)
TESTOST FREE SERPL-MCNC: 9.7 NG/DL (ref 4.7–24.4)
TESTOST SERPL-MCNC: 569 NG/DL (ref 240–950)

## 2021-05-31 NOTE — RESULT ENCOUNTER NOTE
Hadley Ortez,  Your lab results are as below:  1)  Testosterone level is normal at 569.   2)  Cholesterol is slightly elevated at 212, your LDL (bad cholesterol) and your HDL (good cholesterol) are within normal range. Continue to follow a low cholesterol diet and we will recheck this in 1 year.  3)  Glucose is normal at 95 (normal fasting is <100). A1C (test for diabetes) is normal at 5.3%.  4)  Screening for hepatitis C and HIV are negative.   5)  Fit test is negative.  We suggest that this test be completed every year.      If you have any questions do not hesitate to call the clinic to discuss the results with me further.     Sincerely,    Susan Haase, CNP

## 2021-06-02 ENCOUNTER — OFFICE VISIT (OUTPATIENT)
Dept: CARDIOLOGY | Facility: CLINIC | Age: 51
End: 2021-06-02
Attending: NURSE PRACTITIONER

## 2021-06-02 VITALS
WEIGHT: 148 LBS | BODY MASS INDEX: 25.27 KG/M2 | HEIGHT: 64 IN | HEART RATE: 76 BPM | SYSTOLIC BLOOD PRESSURE: 116 MMHG | DIASTOLIC BLOOD PRESSURE: 79 MMHG

## 2021-06-02 DIAGNOSIS — I34.1 MITRAL VALVE PROLAPSE: ICD-10-CM

## 2021-06-02 PROCEDURE — 99204 OFFICE O/P NEW MOD 45 MIN: CPT | Performed by: INTERNAL MEDICINE

## 2021-06-02 PROCEDURE — 93000 ELECTROCARDIOGRAM COMPLETE: CPT | Performed by: INTERNAL MEDICINE

## 2021-06-02 SDOH — HEALTH STABILITY: MENTAL HEALTH: HOW MANY STANDARD DRINKS CONTAINING ALCOHOL DO YOU HAVE ON A TYPICAL DAY?: NOT ASKED

## 2021-06-02 SDOH — HEALTH STABILITY: MENTAL HEALTH: HOW OFTEN DO YOU HAVE 6 OR MORE DRINKS ON ONE OCCASION?: NOT ASKED

## 2021-06-02 SDOH — HEALTH STABILITY: MENTAL HEALTH: HOW OFTEN DO YOU HAVE A DRINK CONTAINING ALCOHOL?: NOT ASKED

## 2021-06-02 ASSESSMENT — MIFFLIN-ST. JEOR: SCORE: 1437.32

## 2021-06-02 NOTE — PROGRESS NOTES
HPI and Plan:   Very pleasant 51-year-old gentleman here for evaluation of mitral valve prolapse.    He was given this diagnosis in 1990 when he was hospitalized for seizure at [facility].  He tells me that he was strapped up to an EKG for 24 hours he also had a cardiac ultrasound.  He was then told he had mitral valve prolapse.  However he has not had any further echo cardiograms or any follow-up for this condition.  For a while he was using antibiotics prior to dental procedures but stopped a few years ago.    No family history of mitral valve prolapse.  His brother had bypass surgery at the age of 58.  He tells me that his brother did not look after himself well.    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  No exertional chest discomfort or shortness of breath.  No palpitations.  He is worried about having atrial fibrillation.  This is because once in a while he would feel muscles twitching in his body.  Once a year he would feel the same sensation in his heart.  He is not sure what triggers it or what relieves it or how long it lasts.  No dizzy spells or syncopal episodes.  I told him that if it happens just once a year we would be unlikely to catch it with with the monitoring devices.  I advised either a Fitbit or an iWatch.    Keep up smoking more than a dozen years ago.  He drinks a couple of beers on the weekend.  He does have concerns about atrial fibrillation and I asked him to significantly cut down his alcohol intake.  He smokes marijuana once in a while.    Cardiac examination is normal.  No murmurs or clicks were elicited with Valsalva change in posture or squatting.    He is also concerned about heart attacks.  His cholesterol numbers are good with HDL of 60 and LDL of 122.  Blood pressure is excellent.  His 10-year risk is computed at 2.9% and as such I would not recommend any further screening.  His baseline ECG is unremarkable.    I am not sure this  gentleman has mitral valve prolapse.  The criteria for this diagnosis is changed significantly over the past 30 years.  I will obtain an echocardiogram for further evaluation and if this is normal I do not think further cardiac testing is necessary at this time.    Orders Placed This Encounter   Procedures     EKG 12-lead complete w/read - Clinics (performed today)     Echocardiogram Complete       No orders of the defined types were placed in this encounter.      Encounter Diagnosis   Name Primary?     Mitral valve prolapse        CURRENT MEDICATIONS:  Current Outpatient Medications   Medication Sig Dispense Refill     ADVAIR HFA 45-21 MCG/ACT inhaler TAKE 2 PUFFS BY MOUTH TWICE A DAY 12 g 0     albuterol (PROAIR HFA/PROVENTIL HFA/VENTOLIN HFA) 108 (90 Base) MCG/ACT inhaler Inhale 2 puffs into the lungs every 6 hours 1 Inhaler 1     fluticasone-salmeterol (ADVAIR-HFA) 115-21 MCG/ACT inhaler Inhale 2 puffs into the lungs 2 times daily 12 g 11       ALLERGIES     Allergies   Allergen Reactions     Seasonal Allergies        PAST MEDICAL HISTORY:  Past Medical History:   Diagnosis Date     Mitral valve prolapse 5/18/2021     Uncomplicated asthma 4/21       PAST SURGICAL HISTORY:  No past surgical history on file.    FAMILY HISTORY:  Family History   Problem Relation Age of Onset     Coronary Artery Disease Brother      Hypertension Brother      Hyperlipidemia Brother      Substance Abuse Brother      Diabetes No family hx of        SOCIAL HISTORY:  Social History     Socioeconomic History     Marital status: Single     Spouse name: None     Number of children: None     Years of education: None     Highest education level: None   Occupational History     None   Social Needs     Financial resource strain: None     Food insecurity     Worry: None     Inability: None     Transportation needs     Medical: None     Non-medical: None   Tobacco Use     Smoking status: Former Smoker     Packs/day: 0.00     Years: 0.00     Pack  "years: 0.00     Start date: 1988     Quit date: 2009     Years since quittin.5     Smokeless tobacco: Never Used   Substance and Sexual Activity     Alcohol use: Yes     Comment: on weekends     Drug use: Yes     Types: Marijuana     Comment: once a week     Sexual activity: Not Currently   Lifestyle     Physical activity     Days per week: None     Minutes per session: None     Stress: None   Relationships     Social connections     Talks on phone: None     Gets together: None     Attends [employer]ism service: None     Active member of club or organization: None     Attends meetings of clubs or organizations: None     Relationship status: None     Intimate partner violence     Fear of current or ex partner: None     Emotionally abused: None     Physically abused: None     Forced sexual activity: None   Other Topics Concern     Parent/sibling w/ CABG, MI or angioplasty before 65F 55M? Yes     Comment: Brother just had quadruple bypass   Social History Narrative     None       Review of Systems:  Skin:  Negative     Eyes:  Negative    ENT:  Positive for epistaxis  Respiratory:  Negative    Cardiovascular:  Negative    Gastroenterology: Positive for heartburn  Genitourinary:  not assessed    Musculoskeletal:  Positive for back pain  Neurologic:  Positive for numbness or tingling of hands;numbness or tingling of feet  Psychiatric:  Negative    Heme/Lymph/Imm:  Positive for hay fever  Endocrine:         Physical Exam:  Vitals: /79   Pulse 76   Ht 1.626 m (5' 4\")   Wt 67.1 kg (148 lb)   BMI 25.40 kg/m      Constitutional:           Skin:             Head:           Eyes:           Lymph:      ENT:           Neck:           Respiratory:            Cardiac:                                                           GI:           Extremities and Muscular Skeletal:                 Neurological:           Psych:           Recent Lab Results:  LIPID RESULTS:  Lab Results   Component Value Date    CHOL 212 (H) " 05/28/2021    HDL 60 05/28/2021     (H) 05/28/2021    TRIG 156 (H) 05/28/2021       LIVER ENZYME RESULTS:  Lab Results   Component Value Date    AST 21 05/28/2021    ALT 34 05/28/2021       CBC RESULTS:  Lab Results   Component Value Date    WBC 5.9 05/28/2021    RBC 4.49 05/28/2021    HGB 14.4 05/28/2021    HCT 41.3 05/28/2021    MCV 92 05/28/2021    MCH 32.1 05/28/2021    MCHC 34.9 05/28/2021    RDW 12.2 05/28/2021     05/28/2021       BMP RESULTS:  Lab Results   Component Value Date     05/28/2021    POTASSIUM 3.9 05/28/2021    CHLORIDE 104 05/28/2021    CO2 30 05/28/2021    ANIONGAP 3 05/28/2021    GLC 95 05/28/2021    BUN 14 05/28/2021    CR 0.82 05/28/2021    GFRESTIMATED >90 05/28/2021    GFRESTBLACK >90 05/28/2021    SHMUEL 8.7 05/28/2021        A1C RESULTS:  Lab Results   Component Value Date    A1C 5.3 05/28/2021       INR RESULTS:  No results found for: INR        CC  Susan Rachele Haase, APRN CNP  [address] [place] [address]

## 2021-06-02 NOTE — LETTER
6/2/2021    [facility]  [address]  [facility] [address]    RE: Mikel Curt       Dear Colleague,    I had the pleasure of seeing Mikel Elias in the [facility].    HPI and Plan:   Very pleasant 51-year-old gentleman here for evaluation of mitral valve prolapse.    He was given this diagnosis in 1990 when he was hospitalized for seizure at [facility].  He tells me that he was strapped up to an EKG for 24 hours he also had a cardiac ultrasound.  He was then told he had mitral valve prolapse.  However he has not had any further echo cardiograms or any follow-up for this condition.  For a while he was using antibiotics prior to dental procedures but stopped a few years ago.    No family history of mitral valve prolapse.  His brother had bypass surgery at the age of 58.  He tells me that his brother did not look after himself well.    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  No exertional chest discomfort or shortness of breath.  No palpitations.  He is worried about having atrial fibrillation.  This is because once in a while he would feel muscles twitching in his body.  Once a year he would feel the same sensation in his heart.  He is not sure what triggers it or what relieves it or how long it lasts.  No dizzy spells or syncopal episodes.  I told him that if it happens just once a year we would be unlikely to catch it with with the monitoring devices.  I advised either a Fitbit or an iWatch.    Keep up smoking more than a dozen years ago.  He drinks a couple of beers on the weekend.  He does have concerns about atrial fibrillation and I asked him to significantly cut down his alcohol intake.  He smokes marijuana once in a while.    Cardiac examination is normal.  No murmurs or clicks were elicited with Valsalva change in posture or squatting.    He is also concerned about heart attacks.   His cholesterol numbers are good with HDL of 60 and LDL of 122.  Blood pressure is excellent.  His 10-year risk is computed at 2.9% and as such I would not recommend any further screening.  His baseline ECG is unremarkable.    I am not sure this gentleman has mitral valve prolapse.  The criteria for this diagnosis is changed significantly over the past 30 years.  I will obtain an echocardiogram for further evaluation and if this is normal I do not think further cardiac testing is necessary at this time.    Orders Placed This Encounter   Procedures     EKG 12-lead complete w/read - Clinics (performed today)     Echocardiogram Complete       No orders of the defined types were placed in this encounter.      Encounter Diagnosis   Name Primary?     Mitral valve prolapse        CURRENT MEDICATIONS:  Current Outpatient Medications   Medication Sig Dispense Refill     ADVAIR HFA 45-21 MCG/ACT inhaler TAKE 2 PUFFS BY MOUTH TWICE A DAY 12 g 0     albuterol (PROAIR HFA/PROVENTIL HFA/VENTOLIN HFA) 108 (90 Base) MCG/ACT inhaler Inhale 2 puffs into the lungs every 6 hours 1 Inhaler 1     fluticasone-salmeterol (ADVAIR-HFA) 115-21 MCG/ACT inhaler Inhale 2 puffs into the lungs 2 times daily 12 g 11       ALLERGIES     Allergies   Allergen Reactions     Seasonal Allergies        PAST MEDICAL HISTORY:  Past Medical History:   Diagnosis Date     Mitral valve prolapse 5/18/2021     Uncomplicated asthma 4/21       PAST SURGICAL HISTORY:  No past surgical history on file.    FAMILY HISTORY:  Family History   Problem Relation Age of Onset     Coronary Artery Disease Brother      Hypertension Brother      Hyperlipidemia Brother      Substance Abuse Brother      Diabetes No family hx of        SOCIAL HISTORY:  Social History     Socioeconomic History     Marital status: Single     Spouse name: None     Number of children: None     Years of education: None     Highest education level: None   Occupational History     None   Social Needs      "Financial resource strain: None     Food insecurity     Worry: None     Inability: None     Transportation needs     Medical: None     Non-medical: None   Tobacco Use     Smoking status: Former Smoker     Packs/day: 0.00     Years: 0.00     Pack years: 0.00     Start date: 1988     Quit date: 2009     Years since quittin.5     Smokeless tobacco: Never Used   Substance and Sexual Activity     Alcohol use: Yes     Comment: on weekends     Drug use: Yes     Types: Marijuana     Comment: once a week     Sexual activity: Not Currently   Lifestyle     Physical activity     Days per week: None     Minutes per session: None     Stress: None   Relationships     Social connections     Talks on phone: None     Gets together: None     Attends [employer] service: None     Active member of club or organization: None     Attends meetings of clubs or organizations: None     Relationship status: None     Intimate partner violence     Fear of current or ex partner: None     Emotionally abused: None     Physically abused: None     Forced sexual activity: None   Other Topics Concern     Parent/sibling w/ CABG, MI or angioplasty before 65F 55M? Yes     Comment: Brother just had quadruple bypass   Social History Narrative     None       Review of Systems:  Skin:  Negative     Eyes:  Negative    ENT:  Positive for epistaxis  Respiratory:  Negative    Cardiovascular:  Negative    Gastroenterology: Positive for heartburn  Genitourinary:  not assessed    Musculoskeletal:  Positive for back pain  Neurologic:  Positive for numbness or tingling of hands;numbness or tingling of feet  Psychiatric:  Negative    Heme/Lymph/Imm:  Positive for hay fever  Endocrine:         Physical Exam:  Vitals: /79   Pulse 76   Ht 1.626 m (5' 4\")   Wt 67.1 kg (148 lb)   BMI 25.40 kg/m      Constitutional:           Skin:             Head:           Eyes:           Lymph:      ENT:           Neck:           Respiratory:            Cardiac:          "                                                  GI:           Extremities and Muscular Skeletal:                 Neurological:           Psych:           Recent Lab Results:  LIPID RESULTS:  Lab Results   Component Value Date    CHOL 212 (H) 05/28/2021    HDL 60 05/28/2021     (H) 05/28/2021    TRIG 156 (H) 05/28/2021       LIVER ENZYME RESULTS:  Lab Results   Component Value Date    AST 21 05/28/2021    ALT 34 05/28/2021       CBC RESULTS:  Lab Results   Component Value Date    WBC 5.9 05/28/2021    RBC 4.49 05/28/2021    HGB 14.4 05/28/2021    HCT 41.3 05/28/2021    MCV 92 05/28/2021    MCH 32.1 05/28/2021    MCHC 34.9 05/28/2021    RDW 12.2 05/28/2021     05/28/2021       BMP RESULTS:  Lab Results   Component Value Date     05/28/2021    POTASSIUM 3.9 05/28/2021    CHLORIDE 104 05/28/2021    CO2 30 05/28/2021    ANIONGAP 3 05/28/2021    GLC 95 05/28/2021    BUN 14 05/28/2021    CR 0.82 05/28/2021    GFRESTIMATED >90 05/28/2021    GFRESTBLACK >90 05/28/2021    SHMUEL 8.7 05/28/2021        A1C RESULTS:  Lab Results   Component Value Date    A1C 5.3 05/28/2021       INR RESULTS:  No results found for: INR    Thank you for allowing me to participate in the care of your patient.      Sincerely,     DR JENNY ELDRIDGE MD     [facility]    cc:   Susan Rachele Haase, APRN CNP  [address] [place] [address]

## 2021-06-15 ENCOUNTER — TELEPHONE (OUTPATIENT)
Dept: FAMILY MEDICINE | Facility: CLINIC | Age: 51
End: 2021-06-15
Payer: COMMERCIAL

## 2021-06-15 NOTE — TELEPHONE ENCOUNTER
Patient Quality Outreach      Summary:    Patient has the following on his problem list/HM:   Asthma review       ACT Total Scores 5/18/2021   ACT TOTAL SCORE (Goal Greater than or Equal to 20) 19   In the past 12 months, how many times did you visit the emergency room for your asthma without being admitted to the hospital? 0   In the past 12 months, how many times were you hospitalized overnight because of your asthma? 0          Patient is due/failing the following:   ACT needed and AAP    Type of outreach:    Sent letter. and Copy of ACT mailed to patient.  Spoke with pt and he states he is at work and asked for ACT to be mailed to him.    Questions for provider review:    None                                                                                                                                     Shireen Riley CMA       Chart routed to Care Team.

## 2021-06-15 NOTE — LETTER
[facility]  [address] [facility] [id number] [phone]  Ivett 15, 2021    Mikel Elias  [address] [place] [address]    Dear Mikel,    I care about your health and have reviewed your health plan. I have reviewed your medical conditions, medication list, and lab results and am making recommendations based on this review, to better manage your health.    You are in particular need of attention regarding:  -Asthma    I am recommending that you:  {recommendations: -Complete and return the attached ASTHMA CONTROL TEST.  If your total score is 19 or less or you have been to the ER or urgent care for your asthma, then please schedule an asthma followup appointment.    Here is a list of Health Maintenance topics that are due now or due soon:  Health Maintenance Due   Topic Date Due     PREVENTIVE CARE VISIT  Never done     ADVANCE CARE PLANNING  Never done     ZOSTER IMMUNIZATION (1 of 2) Never done       Please call us at [phone] (or use [employer]) to address the above recommendations.     Thank you for trusting [facility] and we appreciate the opportunity to serve you.  We look forward to supporting your healthcare needs in the future.    Healthy Regards,    Susan Haase CNP

## 2021-10-10 ENCOUNTER — HEALTH MAINTENANCE LETTER (OUTPATIENT)
Age: 51
End: 2021-10-10

## 2021-12-29 SDOH — ECONOMIC STABILITY: INCOME INSECURITY: IN THE LAST 12 MONTHS, WAS THERE A TIME WHEN YOU WERE NOT ABLE TO PAY THE MORTGAGE OR RENT ON TIME?: NO

## 2021-12-29 SDOH — ECONOMIC STABILITY: FOOD INSECURITY: WITHIN THE PAST 12 MONTHS, YOU WORRIED THAT YOUR FOOD WOULD RUN OUT BEFORE YOU GOT MONEY TO BUY MORE.: NEVER TRUE

## 2021-12-29 SDOH — ECONOMIC STABILITY: INCOME INSECURITY: HOW HARD IS IT FOR YOU TO PAY FOR THE VERY BASICS LIKE FOOD, HOUSING, MEDICAL CARE, AND HEATING?: NOT HARD AT ALL

## 2021-12-29 SDOH — ECONOMIC STABILITY: TRANSPORTATION INSECURITY
IN THE PAST 12 MONTHS, HAS THE LACK OF TRANSPORTATION KEPT YOU FROM MEDICAL APPOINTMENTS OR FROM GETTING MEDICATIONS?: NO

## 2021-12-29 SDOH — HEALTH STABILITY: PHYSICAL HEALTH: ON AVERAGE, HOW MANY MINUTES DO YOU ENGAGE IN EXERCISE AT THIS LEVEL?: 0 MIN

## 2021-12-29 SDOH — ECONOMIC STABILITY: FOOD INSECURITY: WITHIN THE PAST 12 MONTHS, THE FOOD YOU BOUGHT JUST DIDN'T LAST AND YOU DIDN'T HAVE MONEY TO GET MORE.: NEVER TRUE

## 2021-12-29 SDOH — ECONOMIC STABILITY: TRANSPORTATION INSECURITY
IN THE PAST 12 MONTHS, HAS LACK OF TRANSPORTATION KEPT YOU FROM MEETINGS, WORK, OR FROM GETTING THINGS NEEDED FOR DAILY LIVING?: NO

## 2021-12-29 SDOH — HEALTH STABILITY: PHYSICAL HEALTH: ON AVERAGE, HOW MANY DAYS PER WEEK DO YOU ENGAGE IN MODERATE TO STRENUOUS EXERCISE (LIKE A BRISK WALK)?: 0 DAYS

## 2021-12-29 ASSESSMENT — ENCOUNTER SYMPTOMS
HEARTBURN: 1
MYALGIAS: 1
DIARRHEA: 0
SORE THROAT: 1
SHORTNESS OF BREATH: 1
HEMATOCHEZIA: 0
NERVOUS/ANXIOUS: 0
PALPITATIONS: 0
ABDOMINAL PAIN: 0
NAUSEA: 1
ARTHRALGIAS: 1
WEAKNESS: 0
CHILLS: 0
CONSTIPATION: 0
FREQUENCY: 1
PARESTHESIAS: 0
JOINT SWELLING: 1
DIZZINESS: 0
COUGH: 1
HEADACHES: 1
HEMATURIA: 0
FEVER: 0
DYSURIA: 0
EYE PAIN: 0

## 2021-12-29 ASSESSMENT — SOCIAL DETERMINANTS OF HEALTH (SDOH)
ARE YOU MARRIED, WIDOWED, DIVORCED, SEPARATED, NEVER MARRIED, OR LIVING WITH A PARTNER?: NEVER MARRIED
DO YOU BELONG TO ANY CLUBS OR ORGANIZATIONS SUCH AS CHURCH GROUPS UNIONS, FRATERNAL OR ATHLETIC GROUPS, OR SCHOOL GROUPS?: YES
HOW OFTEN DO YOU ATTEND CHURCH OR RELIGIOUS SERVICES?: NEVER
IN A TYPICAL WEEK, HOW MANY TIMES DO YOU TALK ON THE PHONE WITH FAMILY, FRIENDS, OR NEIGHBORS?: NEVER
HOW OFTEN DO YOU GET TOGETHER WITH FRIENDS OR RELATIVES?: ONCE A WEEK

## 2021-12-29 ASSESSMENT — LIFESTYLE VARIABLES
HOW OFTEN DO YOU HAVE SIX OR MORE DRINKS ON ONE OCCASION: NEVER
HOW OFTEN DO YOU HAVE A DRINK CONTAINING ALCOHOL: 2-3 TIMES A WEEK
HOW MANY STANDARD DRINKS CONTAINING ALCOHOL DO YOU HAVE ON A TYPICAL DAY: 1 OR 2

## 2021-12-31 ENCOUNTER — OFFICE VISIT (OUTPATIENT)
Dept: FAMILY MEDICINE | Facility: CLINIC | Age: 51
End: 2021-12-31
Payer: COMMERCIAL

## 2021-12-31 VITALS
DIASTOLIC BLOOD PRESSURE: 80 MMHG | SYSTOLIC BLOOD PRESSURE: 133 MMHG | BODY MASS INDEX: 24.4 KG/M2 | TEMPERATURE: 98.4 F | HEART RATE: 68 BPM | WEIGHT: 142.13 LBS | OXYGEN SATURATION: 95 %

## 2021-12-31 DIAGNOSIS — R39.15 URINARY URGENCY: ICD-10-CM

## 2021-12-31 DIAGNOSIS — M79.641 BILATERAL HAND PAIN: ICD-10-CM

## 2021-12-31 DIAGNOSIS — Z00.00 ROUTINE GENERAL MEDICAL EXAMINATION AT A HEALTH CARE FACILITY: Primary | ICD-10-CM

## 2021-12-31 DIAGNOSIS — K21.00 GASTROESOPHAGEAL REFLUX DISEASE WITH ESOPHAGITIS WITHOUT HEMORRHAGE: ICD-10-CM

## 2021-12-31 DIAGNOSIS — M79.642 BILATERAL HAND PAIN: ICD-10-CM

## 2021-12-31 DIAGNOSIS — J45.40 MODERATE PERSISTENT ASTHMA WITHOUT COMPLICATION: ICD-10-CM

## 2021-12-31 PROBLEM — N32.81 OAB (OVERACTIVE BLADDER): Status: ACTIVE | Noted: 2021-01-01

## 2021-12-31 LAB — PSA SERPL-MCNC: 0.56 UG/L (ref 0–4)

## 2021-12-31 PROCEDURE — G0103 PSA SCREENING: HCPCS | Performed by: NURSE PRACTITIONER

## 2021-12-31 PROCEDURE — 99214 OFFICE O/P EST MOD 30 MIN: CPT | Mod: 25 | Performed by: NURSE PRACTITIONER

## 2021-12-31 PROCEDURE — 99396 PREV VISIT EST AGE 40-64: CPT | Performed by: NURSE PRACTITIONER

## 2021-12-31 PROCEDURE — 36415 COLL VENOUS BLD VENIPUNCTURE: CPT | Performed by: NURSE PRACTITIONER

## 2021-12-31 RX ORDER — FLUTICASONE PROPIONATE AND SALMETEROL XINAFOATE 115; 21 UG/1; UG/1
2 AEROSOL, METERED RESPIRATORY (INHALATION) 2 TIMES DAILY
Qty: 12 G | Refills: 11 | Status: SHIPPED | OUTPATIENT
Start: 2021-12-31 | End: 2022-11-11

## 2021-12-31 RX ORDER — ALBUTEROL SULFATE 90 UG/1
2 AEROSOL, METERED RESPIRATORY (INHALATION) EVERY 6 HOURS
Qty: 18 G | Refills: 4 | Status: SHIPPED | OUTPATIENT
Start: 2021-12-31 | End: 2023-02-27

## 2021-12-31 RX ORDER — FAMOTIDINE 20 MG/1
20 TABLET, FILM COATED ORAL DAILY
Qty: 30 TABLET | Refills: 1 | Status: SHIPPED | OUTPATIENT
Start: 2021-12-31 | End: 2023-02-27

## 2021-12-31 ASSESSMENT — ENCOUNTER SYMPTOMS
HEMATOCHEZIA: 0
SHORTNESS OF BREATH: 1
CHILLS: 0
ARTHRALGIAS: 1
HEMATURIA: 0
HEADACHES: 1
NERVOUS/ANXIOUS: 0
DIZZINESS: 0
HEARTBURN: 1
COUGH: 1
PARESTHESIAS: 0
JOINT SWELLING: 1
DYSURIA: 0
SORE THROAT: 1
NAUSEA: 1
CONSTIPATION: 0
DIARRHEA: 0
PALPITATIONS: 0
FREQUENCY: 1
ABDOMINAL PAIN: 0
FEVER: 0
EYE PAIN: 0
WEAKNESS: 0
MYALGIAS: 1

## 2021-12-31 NOTE — PROGRESS NOTES
SUBJECTIVE:   CC: Mikel Elias is an 51 year old male who presents for preventative health visit.     {Patient has been advised of split billing requirements and indicates understanding: Yes  Healthy Habits:     Getting at least 3 servings of Calcium per day:  NO    Bi-annual eye exam:  Yes    Dental care twice a year:  Yes    Sleep apnea or symptoms of sleep apnea:  Daytime drowsiness    Diet:  Regular (no restrictions)    Frequency of exercise:  1 day/week    Duration of exercise:  Less than 15 minutes    Taking medications regularly:  Yes    Medication side effects:  Not applicable    PHQ-2 Total Score: 0    Additional concerns today:  No    Today's PHQ-2 Score:   PHQ-2 (  [employer]) 2021   Q1: Little interest or pleasure in doing things 0   Q2: Feeling down, depressed or hopeless 0   PHQ-2 Score 0   PHQ-2 Total Score (12-17 Years)- Positive if 3 or more points; Administer PHQ-A if positive -   Q1: Little interest or pleasure in doing things Not at all   Q2: Feeling down, depressed or hopeless Not at all   PHQ-2 Score 0     Abuse: Current or Past(Physical, Sexual or Emotional)- No  Do you feel safe in your environment? Yes        Social History     Tobacco Use     Smoking status: Former Smoker     Packs/day: 0.00     Years: 0.00     Pack years: 0.00     Start date: 1988     Quit date:      Years since quittin.1     Smokeless tobacco: Never Used   Substance Use Topics     Alcohol use: Yes     Comment: on weekends     If you drink alcohol do you typically have >3 drinks per day or >7 drinks per week? No    Alcohol Use 2021   Prescreen: >3 drinks/day or >7 drinks/week? Yes   AUDIT SCORE  3       Last PSA: No results found for: PSA    Reviewed orders with patient. Reviewed health maintenance and updated orders accordingly - Yes  Labs reviewed in EPIC  BP Readings from Last 3 Encounters:   21 133/80   21 116/79   21 108/70    Wt Readings from Last 3 Encounters:   21  64.5 kg (142 lb 2 oz)   21 67.1 kg (148 lb)   21 66.3 kg (146 lb 3.2 oz)                  Patient Active Problem List   Diagnosis     Seasonal allergic rhinitis     Moderate persistent asthma without complication     Mitral valve prolapse     History reviewed. No pertinent surgical history.    Social History     Tobacco Use     Smoking status: Former Smoker     Packs/day: 0.00     Years: 0.00     Pack years: 0.00     Start date: 1988     Quit date:      Years since quittin.1     Smokeless tobacco: Never Used   Substance Use Topics     Alcohol use: Yes     Comment: on weekends     Family History   Problem Relation Age of Onset     Coronary Artery Disease Brother      Hypertension Brother      Hyperlipidemia Brother      Substance Abuse Brother      Diabetes No family hx of          Current Outpatient Medications   Medication Sig Dispense Refill     ADVAIR HFA 45-21 MCG/ACT inhaler TAKE 2 PUFFS BY MOUTH TWICE A DAY 12 g 0     albuterol (PROAIR HFA/PROVENTIL HFA/VENTOLIN HFA) 108 (90 Base) MCG/ACT inhaler Inhale 2 puffs into the lungs every 6 hours 1 Inhaler 1     fluticasone-salmeterol (ADVAIR-HFA) 115-21 MCG/ACT inhaler Inhale 2 puffs into the lungs 2 times daily 12 g 11       Reviewed and updated as needed this visit by clinical staff  Tobacco  Allergies    Med Hx  Surg Hx  Fam Hx  Soc Hx       Reviewed and updated as needed this visit by Provider                   Review of Systems   Constitutional: Negative for chills and fever.   HENT: Positive for congestion and sore throat. Negative for ear pain and hearing loss.    Eyes: Negative for pain and visual disturbance.   Respiratory: Positive for cough and shortness of breath.    Cardiovascular: Positive for chest pain. Negative for palpitations and peripheral edema.   Gastrointestinal: Positive for heartburn and nausea. Negative for abdominal pain, constipation, diarrhea and hematochezia.   Genitourinary: Positive for frequency,  impotence and urgency. Negative for dysuria, genital sores, hematuria and penile discharge.   Musculoskeletal: Positive for arthralgias, joint swelling and myalgias.   Skin: Negative for rash.   Neurological: Positive for headaches. Negative for dizziness, weakness and paresthesias.   Psychiatric/Behavioral: Negative for mood changes. The patient is not nervous/anxious.    Asthma:  ACT of 16.  Currently using Advair 2 puffs twice a day. Is not using rescue inhaler at all.  Feeling shortness of breath frequently with activity.    Urinary urgency:  For the past year has had issues with urgency and frequency, increased issues with caffeine.  Denies dysuria or hematuria.   Getting up x 1 at night to use the bathroom.  GERD:  Notices with eating turkey and acidic cough.  Only takes Tums.   Hand and wrist pain:  Constant pain, increases as the day goes on.  Smokes marijuana for pain relief interested in prescription gummies.   OBJECTIVE:   /80 (BP Location: Right arm, Patient Position: Chair, Cuff Size: Adult Regular)   Pulse 68   Temp 98.4  F (36.9  C) (Oral)   Wt 64.5 kg (142 lb 2 oz)   SpO2 95%   BMI 24.40 kg/m    Physical Exam  GENERAL: healthy, alert and no distress  EYES: Eyes grossly normal to inspection,  HENT: ear canals and TM's normal, nose and mouth without ulcers or lesions  NECK: no adenopathy, no asymmetry, masses, or scars and thyroid normal to palpation  RESP: lungs clear to auscultation - no rales, rhonchi or wheezes  CV: regular rate and rhythm, normal S1 S2, no S3 or S4, no murmur, click or rub, no peripheral edema  ABDOMEN: soft, nontender, no hepatosplenomegaly, no masses and bowel sounds normal  MS: no gross musculoskeletal defects noted, no edema  SKIN: no suspicious lesions or rashes  NEURO: Normal strength and tone, mentation intact and speech normal  PSYCH: mentation appears normal, affect normal/bright    ASSESSMENT/PLAN:   Mikel was seen today for physical.    Diagnoses and all  "orders for this visit:    Routine general medical examination at a health care facility    Moderate persistent asthma without complication: ACT of 16, will increase Advair HFA to 115/21.  Will call in 4 weeks to recheck.   -     fluticasone-salmeterol (ADVAIR-HFA) 115-21 MCG/ACT inhaler; Inhale 2 puffs into the lungs 2 times daily  -     albuterol (PROAIR HFA/PROVENTIL HFA/VENTOLIN HFA) 108 (90 Base) MCG/ACT inhaler; Inhale 2 puffs into the lungs every 6 hours    Urinary urgency: will obtain PSA screen.  Discussed avoidance of coffee.  -     PSA, screen; Future  -     PSA, screen    Gastroesophageal reflux disease with esophagitis without hemorrhage: discussed trial of Pepcid every am 30 min prior to breakfast, may improve asthma.  Discussed avoidance of foods that increase symptoms.   -     famotidine (PEPCID) 20 MG tablet; Take 1 tablet (20 mg) by mouth daily    Bilateral hand pain: given phone # for [employer] to discuss medical marijuana certification.        Patient has been advised of split billing requirements and indicates understanding:   COUNSELING:   Reviewed preventive health counseling, as reflected in patient instructions       Regular exercise       Healthy diet/nutrition    Estimated body mass index is 24.4 kg/m  as calculated from the following:    Height as of 6/2/21: 1.626 m (5' 4\").    Weight as of this encounter: 64.5 kg (142 lb 2 oz).         He reports that he quit smoking about 12 years ago. He started smoking about 34 years ago. He smoked 0.00 packs per day for 0.00 years. He has never used smokeless tobacco.      Counseling Resources:  ATP IV Guidelines  Pooled Cohorts Equation Calculator  FRAX Risk Assessment  ICSI Preventive Guidelines  Dietary Guidelines for Americans, 2010  USDA's MyPlate  ASA Prophylaxis  Lung CA Screening    Susan Haase, APRN [facility]"

## 2022-02-01 ASSESSMENT — ASTHMA QUESTIONNAIRES: ACT_TOTALSCORE: 20

## 2022-11-11 DIAGNOSIS — J45.40 MODERATE PERSISTENT ASTHMA WITHOUT COMPLICATION: ICD-10-CM

## 2022-11-11 RX ORDER — FLUTICASONE PROPIONATE AND SALMETEROL XINAFOATE 115; 21 UG/1; UG/1
AEROSOL, METERED RESPIRATORY (INHALATION)
Qty: 12 G | Refills: 1 | Status: SHIPPED | OUTPATIENT
Start: 2022-11-11 | End: 2022-11-11

## 2022-11-11 RX ORDER — FLUTICASONE PROPIONATE AND SALMETEROL XINAFOATE 115; 21 UG/1; UG/1
AEROSOL, METERED RESPIRATORY (INHALATION)
Qty: 36 G | Refills: 0 | Status: SHIPPED | OUTPATIENT
Start: 2022-11-11 | End: 2023-02-13

## 2022-11-11 NOTE — TELEPHONE ENCOUNTER
Routing refill request to provider for review/approval because:  Patient needs to be seen because: Patient has not been seen in the pats 6 months.   Patient does not have an Asthma Control Assessment score on file in the past 6 months.     ACT Total Scores 5/18/2021 12/31/2021 2/1/2022   ACT TOTAL SCORE (Goal Greater than or Equal to 20) 19 16 20   In the past 12 months, how many times did you visit the emergency room for your asthma without being admitted to the hospital? 0 0 0   In the past 12 months, how many times were you hospitalized overnight because of your asthma? 0 0 0     Patient needs to establish care with a new provider as the patient's PCP has retired.     Nilsa HASTINGS RN   Patient Advocate Liaison (PAL)  [facility]

## 2022-11-11 NOTE — TELEPHONE ENCOUNTER
Routing refill request to provider for review/approval because:  Approved today, but insurance requesting 90 day, no appointment scheduled.    MA/ to assist with updating needed screenings and/or scheduling follow up/physical appointment.    Mikel Chavarria RN on 11/11/2022 at 2:36 PM

## 2023-02-12 DIAGNOSIS — J45.40 MODERATE PERSISTENT ASTHMA WITHOUT COMPLICATION: ICD-10-CM

## 2023-02-13 RX ORDER — FLUTICASONE PROPIONATE AND SALMETEROL XINAFOATE 115; 21 UG/1; UG/1
AEROSOL, METERED RESPIRATORY (INHALATION)
Qty: 36 G | Refills: 0 | Status: SHIPPED | OUTPATIENT
Start: 2023-02-13 | End: 2023-02-27

## 2023-02-13 NOTE — TELEPHONE ENCOUNTER
Prescription approved per [facility] Refill Protocol.    Dana Acevedo RN   PAL (Patient Advocate Liason)  [facility]

## 2023-02-27 ENCOUNTER — OFFICE VISIT (OUTPATIENT)
Dept: INTERNAL MEDICINE | Facility: CLINIC | Age: 53
End: 2023-02-27
Payer: COMMERCIAL

## 2023-02-27 VITALS
RESPIRATION RATE: 16 BRPM | BODY MASS INDEX: 24.59 KG/M2 | HEART RATE: 78 BPM | TEMPERATURE: 97.8 F | WEIGHT: 144 LBS | OXYGEN SATURATION: 99 % | DIASTOLIC BLOOD PRESSURE: 76 MMHG | HEIGHT: 64 IN | SYSTOLIC BLOOD PRESSURE: 118 MMHG

## 2023-02-27 DIAGNOSIS — Z13.29 SCREENING FOR THYROID DISORDER: ICD-10-CM

## 2023-02-27 DIAGNOSIS — H04.123 DRY EYES: ICD-10-CM

## 2023-02-27 DIAGNOSIS — Z12.11 SCREEN FOR COLON CANCER: ICD-10-CM

## 2023-02-27 DIAGNOSIS — H53.9 VISION CHANGES: ICD-10-CM

## 2023-02-27 DIAGNOSIS — Z12.5 ENCOUNTER FOR PROSTATE CANCER SCREENING: ICD-10-CM

## 2023-02-27 DIAGNOSIS — Z13.220 SCREENING FOR HYPERLIPIDEMIA: ICD-10-CM

## 2023-02-27 DIAGNOSIS — Z82.49 FAMILY HISTORY OF HYPERTROPHIC CARDIOMYOPATHY: ICD-10-CM

## 2023-02-27 DIAGNOSIS — J45.40 MODERATE PERSISTENT ASTHMA WITHOUT COMPLICATION: ICD-10-CM

## 2023-02-27 DIAGNOSIS — Z00.00 ANNUAL PHYSICAL EXAM: Primary | ICD-10-CM

## 2023-02-27 PROCEDURE — 99213 OFFICE O/P EST LOW 20 MIN: CPT | Mod: 25 | Performed by: INTERNAL MEDICINE

## 2023-02-27 PROCEDURE — 99396 PREV VISIT EST AGE 40-64: CPT | Performed by: INTERNAL MEDICINE

## 2023-02-27 RX ORDER — ALBUTEROL SULFATE 90 UG/1
2 AEROSOL, METERED RESPIRATORY (INHALATION) EVERY 6 HOURS
Qty: 18 G | Refills: 4 | Status: SHIPPED | OUTPATIENT
Start: 2023-02-27

## 2023-02-27 RX ORDER — FLUTICASONE PROPIONATE AND SALMETEROL XINAFOATE 115; 21 UG/1; UG/1
2 AEROSOL, METERED RESPIRATORY (INHALATION) 2 TIMES DAILY
Qty: 36 G | Refills: 3 | Status: SHIPPED | OUTPATIENT
Start: 2023-02-27

## 2023-02-27 ASSESSMENT — ENCOUNTER SYMPTOMS
DIZZINESS: 0
MYALGIAS: 0
HEARTBURN: 1
ARTHRALGIAS: 1
HEADACHES: 0
SHORTNESS OF BREATH: 0
SORE THROAT: 0
COUGH: 0
CHILLS: 0
FEVER: 0
DYSURIA: 0
EYE PAIN: 1
FREQUENCY: 1
PALPITATIONS: 0
CONSTIPATION: 0
HEMATURIA: 0
NAUSEA: 0
WEAKNESS: 0
NERVOUS/ANXIOUS: 0
HEMATOCHEZIA: 0
PARESTHESIAS: 0
DIARRHEA: 0
ABDOMINAL PAIN: 0
JOINT SWELLING: 1

## 2023-02-27 ASSESSMENT — ASTHMA QUESTIONNAIRES
QUESTION_4 LAST FOUR WEEKS HOW OFTEN HAVE YOU USED YOUR RESCUE INHALER OR NEBULIZER MEDICATION (SUCH AS ALBUTEROL): NOT AT ALL
ACT_TOTALSCORE: 20
QUESTION_2 LAST FOUR WEEKS HOW OFTEN HAVE YOU HAD SHORTNESS OF BREATH: THREE TO SIX TIMES A WEEK
QUESTION_1 LAST FOUR WEEKS HOW MUCH OF THE TIME DID YOUR ASTHMA KEEP YOU FROM GETTING AS MUCH DONE AT WORK, SCHOOL OR AT HOME: NONE OF THE TIME
QUESTION_3 LAST FOUR WEEKS HOW OFTEN DID YOUR ASTHMA SYMPTOMS (WHEEZING, COUGHING, SHORTNESS OF BREATH, CHEST TIGHTNESS OR PAIN) WAKE YOU UP AT NIGHT OR EARLIER THAN USUAL IN THE MORNING: ONCE A WEEK
ACT_TOTALSCORE: 25
QUESTION_5 LAST FOUR WEEKS HOW WOULD YOU RATE YOUR ASTHMA CONTROL: WELL CONTROLLED

## 2023-02-27 NOTE — PROGRESS NOTES
"SUBJECTIVE:   CC: Pérez is an 53 year old who presents for preventative health visit.     Patient has been advised of split billing requirements and indicates understanding: Yes  Patient is a 53-year-old  male who presents to the clinic for his annual physical.  His main concern today is in regards to his eyes.  He reports that for the last 6 to 7 years he has had some progressively worsening issues involving both of his eyes.  Patient states that during the winter months he has issues with dry eyes as well as redness, discomfort, and itching.  His symptoms do seem to improve during the warmer months of the year.  Patient was recently in [place] for a week, and he does state that his symptoms have resolved for the time being.  Patient is also noted that his vision has been getting progressively worse as well.  Patient has tried utilizing over-the-counter \"pinkeye\" drops, but they have not been very effective.  Patient also has a history of moderate, persistent asthma.  He does use Advair /21 mcg per actuation with instruction to inhale 2 puffs twice per day as a controller therapy.  Patient reports that he has only used his albuterol inhaler once in the past year.  He makes every effort to be compliant with his medications.  Patient also reports that his mother was recently diagnosed with hypertrophic cardiomyopathy.  Patient states that he was informed that he should have his own heart evaluated as there is a strong possibility that he could have the same condition.  He is not aware of any other family history of cardiac issues.  Otherwise, patient is doing well.  He reports a stable appetite.  Patient is stooling and voiding without issue.  He is not fasting today.    Healthy Habits:     Getting at least 3 servings of Calcium per day:  NO    Bi-annual eye exam:  NO    Dental care twice a year:  Yes    Sleep apnea or symptoms of sleep apnea:  Daytime drowsiness    Diet:  Regular (no restrictions)    " Frequency of exercise:  1 day/week    Duration of exercise:  45-60 minutes    Taking medications regularly:  Yes    Medication side effects:  None    PHQ-2 Total Score: 0    Additional concerns today:  No    Ability to successfully perform activities of daily living: Yes, no assistance needed  Home safety:  none identified   Hearing impairment: No            Today's PHQ-2 Score:   PHQ-2 (  [employer]) 2023   Q1: Little interest or pleasure in doing things 0   Q2: Feeling down, depressed or hopeless 0   PHQ-2 Score 0   PHQ-2 Total Score (12-17 Years)- Positive if 3 or more points; Administer PHQ-A if positive -   Q1: Little interest or pleasure in doing things Not at all   Q2: Feeling down, depressed or hopeless Not at all   PHQ-2 Score 0       Have you ever done Advance Care Planning? (For example, a Health Directive, POLST, or a discussion with a medical provider or your loved ones about your wishes): No, advance care planning information given to patient to review.  Advanced care planning was discussed at today's visit.    Social History     Tobacco Use     Smoking status: Former     Packs/day: 0.00     Years: 0.00     Pack years: 0.00     Types: Cigarettes     Start date: 1988     Quit date: 2009     Years since quittin.3     Smokeless tobacco: Never   Substance Use Topics     Alcohol use: Yes     Comment: on weekends         Alcohol Use 2023   Prescreen: >3 drinks/day or >7 drinks/week? No   Prescreen: >3 drinks/day or >7 drinks/week? -   AUDIT SCORE  -       Last PSA:   Prostate Specific Antigen Screen   Date Value Ref Range Status   2021 0.56 0.00 - 4.00 ug/L Final       Reviewed orders with patient. Reviewed health maintenance and updated orders accordingly - Yes  Lab work is in process    Reviewed and updated as needed this visit by clinical staff   Tobacco  Allergies  Meds              Reviewed and updated as needed this visit by Provider                     Review of Systems  "  Constitutional: Negative for chills and fever.   HENT: Negative for congestion, ear pain, hearing loss and sore throat.    Eyes: Positive for pain and visual disturbance.   Respiratory: Negative for cough and shortness of breath.    Cardiovascular: Negative for chest pain, palpitations and peripheral edema.   Gastrointestinal: Positive for heartburn. Negative for abdominal pain, constipation, diarrhea, hematochezia and nausea.   Genitourinary: Positive for frequency, impotence and urgency. Negative for dysuria, genital sores, hematuria and penile discharge.   Musculoskeletal: Positive for arthralgias and joint swelling. Negative for myalgias.   Skin: Negative for rash.   Neurological: Negative for dizziness, weakness, headaches and paresthesias.   Psychiatric/Behavioral: Negative for mood changes. The patient is not nervous/anxious.          OBJECTIVE:   Blood pressure 118/76, pulse 78, temperature 97.8  F (36.6  C), resp. rate 16, height 1.626 m (5' 4\"), weight 65.3 kg (144 lb), SpO2 99 %.        Physical Exam  Vitals reviewed.   Constitutional:       Appearance: Normal appearance.   HENT:      Head: Normocephalic and atraumatic.      Right Ear: Tympanic membrane, ear canal and external ear normal.      Left Ear: Tympanic membrane, ear canal and external ear normal.      Mouth/Throat:      Mouth: Mucous membranes are moist.      Pharynx: Oropharynx is clear.   Eyes:      Extraocular Movements: Extraocular movements intact.      Conjunctiva/sclera: Conjunctivae normal.      Pupils: Pupils are equal, round, and reactive to light.   Cardiovascular:      Rate and Rhythm: Normal rate and regular rhythm.      Pulses: Normal pulses.      Heart sounds: Normal heart sounds.   Pulmonary:      Effort: Pulmonary effort is normal.      Breath sounds: Normal breath sounds.   Abdominal:      General: Abdomen is flat. Bowel sounds are normal.      Palpations: Abdomen is soft.   Musculoskeletal:         General: Normal range of " motion.      Cervical back: Normal range of motion and neck supple.   Skin:     General: Skin is warm and dry.      Capillary Refill: Capillary refill takes less than 2 seconds.   Neurological:      General: No focal deficit present.      Mental Status: He is alert and oriented to person, place, and time.       Diagnostic Test Results: Future lab orders for CMP, CBC, FLP, TSH, and PSA have been placed.  Echocardiogram has also been ordered.    ASSESSMENT/PLAN:   (Z00.00) Annual physical exam  (primary encounter diagnosis)  Comment: At this time, patient does have a relatively unremarkable physical examination.  His blood pressure is noted to be at an acceptable level.  We did spend some time discussing appropriate dietary lifestyle modifications necessary to help keep his weight and blood pressure under good control.  Patient will return at a later time for fasting lab collection.  All health maintenance items were addressed.    (Z12.11) Screen for colon cancer  Comment: Fecal colorectal cancer screen FIT - Future         (S+30) is pending.    (J45.40) Moderate persistent asthma without complication  Comment: At this time, it does appear that the patient's asthma is under good control as he is requiring very infrequent use of his albuterol.  Patient will continue his Advair /21 mcg per actuation with instruction to 2 puffs inhaled twice per day as a controller therapy.  He can also continue on with his as needed use of albuterol.  Side effects of each medication were discussed.  Appropriate inhaler technique was reviewed.  Asthma action plan completed today.    (Z82.49) Family history of hypertrophic cardiomyopathy  Comment: Given that his mother was recently diagnosed with hypertrophic cardiomyopathy, I do feel that it is worthwhile to pursue screening for the patient given that he does have a high probability of having the condition himself.  Patient was in agreement, and in echocardiogram has been  ordered.    (H04.123) Dry eyes and (H53.9) Vision changes  Comment: I suspect that his issues with his eyes are likely allergy related given that they only occur during certain months of the year.  However, he is reporting some vision changes associated with these ongoing symptoms as well.  Given this information, a referral to optometry for a more comprehensive vision examination has been placed.    (Z13.220) Screening for hyperlipidemia  Comment: Lipid panel reflex to direct LDL Fasting is pending.    (Z12.5) Encounter for prostate cancer screening  Comment: PSA, screen is pending.    (Z13.29) Screening for thyroid disorder  Comment: TSH with free T4 reflex is pending.      Patient has been advised of split billing requirements and indicates understanding: Yes      COUNSELING:   Reviewed preventive health counseling, as reflected in patient instructions        He reports that he quit smoking about 13 years ago. He started smoking about 35 years ago. He has never used smokeless tobacco.        Joey Fritz MD  [facility]

## 2023-02-27 NOTE — PATIENT INSTRUCTIONS
My Asthma Action Plan    Name: Mikel Elias   YOB: 1970  Date: 2/27/2023   My doctor: Joey Fritz MD   My clinic: [facility]        My Control Medicine: Fluticasone propionate + salmeterol (Advair HFA) -  115/21 mcg 2 puffs BID  My Rescue Medicine: Albuterol (Proair/Ventolin/Proventil HFA) 2-4 puffs EVERY 4 HOURS as needed. Use a spacer if recommended by your provider.   My Asthma Severity:   Moderate Persistent  Know your asthma triggers: smoke and upper respiratory infections  pollens  humidity  exercise or sports            GREEN ZONE   Good Control  I feel good  No cough or wheeze  Can work, sleep and play without asthma symptoms       Take your asthma control medicine every day.     If exercise triggers your asthma, take your rescue medication  15 minutes before exercise or sports, and  During exercise if you have asthma symptoms  Spacer to use with inhaler: If you have a spacer, make sure to use it with your inhaler             YELLOW ZONE Getting Worse  I have ANY of these:  I do not feel good  Cough or wheeze  Chest feels tight  Wake up at night   Keep taking your Green Zone medications  Start taking your rescue medicine:  every 20 minutes for up to 1 hour. Then every 4 hours for 24-48 hours.  If you stay in the Yellow Zone for more than 12-24 hours, contact your doctor.  If you do not return to the Green Zone in 12-24 hours or you get worse, start taking your oral steroid medicine if prescribed by your provider.           RED ZONE Medical Alert - Get Help  I have ANY of these:  I feel awful  Medicine is not helping  Breathing getting harder  Trouble walking or talking  Nose opens wide to breathe       Take your rescue medicine NOW  If your provider has prescribed an oral steroid medicine, start taking it NOW  Call your doctor NOW  If you are still in the Red Zone after 20 minutes and you have not reached your doctor:  Take your rescue medicine again  and  Call 911 or go to the emergency room right away    See your regular doctor within 2 weeks of an Emergency Room or Urgent Care visit for follow-up treatment.          Annual Reminders:  Meet with Asthma Educator,  Flu Shot in the Fall, consider Pneumonia Vaccination for patients with asthma (aged 19 and older).    Pharmacy:    [facility][id number] - [place], [place] - [address]  [facility][id number] - [place], [place] - [address]    Electronically signed by Joey Fritz MD   Date: 02/27/23                      Asthma Triggers  How To Control Things That Make Your Asthma Worse    Triggers are things that make your asthma worse.  Look at the list below to help you find your triggers and what you can do about them.  You can help prevent asthma flare-ups by staying away from your triggers.      Trigger                                                          What you can do   Cigarette Smoke  Tobacco smoke can make asthma worse. Do not allow smoking in your home, car or around you.  Be sure no one smokes at a child s day care or school.  If you smoke, ask your health care provider for ways to help you quit.  Ask family members to quit too.  Ask your health care provider for a referral to Quit Plan to help you quit smoking, or call 5-266-968-PLAN.     Colds, Flu, Bronchitis  These are common triggers of asthma. Wash your hands often.  Don t touch your eyes, nose or mouth.  Get a flu shot every year.     Dust Mites  These are tiny bugs that live in cloth or carpet. They are too small to see. Wash sheets and blankets in hot water every week.   Encase pillows and mattress in dust mite proof covers.  Avoid having carpet if you can. If you have carpet, vacuum weekly.   Use a dust mask and HEPA vacuum.   Pollen and Outdoor Mold  Some people are allergic to trees, grass, or weed pollen, or molds. Try to keep your windows closed.  Limit time out doors when pollen count is high.   Ask you health care  provider about taking medicine during allergy season.     Animal Dander  Some people are allergic to skin flakes, urine or saliva from pets with fur or feathers. Keep pets with fur or feathers out of your home.    If you can t keep the pet outdoors, then keep the pet out of your bedroom.  Keep the bedroom door closed.  Keep pets off cloth furniture and away from stuffed toys.     Mice, Rats, and Cockroaches   Some people are allergic to the waste from these pests.   Cover food and garbage.  Clean up spills and food crumbs.  Store grease in the refrigerator.   Keep food out of the bedroom.   Indoor Mold  This can be a trigger if your home has high moisture. Fix leaking faucets, pipes, or other sources of water.   Clean moldy surfaces.  Dehumidify basement if it is damp and smelly.   Smoke, Strong Odors, and Sprays  These can reduce air quality. Stay away from strong odors and sprays, such as perfume, powder, hair spray, paints, smoke incense, paint, cleaning products, candles and new carpet.   Exercise or Sports  Some people with asthma have this trigger. Be active!  Ask your doctor about taking medicine before sports or exercise to prevent symptoms.    Warm up for 5-10 minutes before and after sports or exercise.     Other Triggers of Asthma  Cold air:  Cover your nose and mouth with a scarf.  Sometimes laughing or crying can be a trigger.  Some medicines and food can trigger asthma.     Preventive Health Recommendations  Male Ages 50 - 64    Yearly exam:             See your health care provider every year in order to  o   Review health changes.   o   Discuss preventive care.    o   Review your medicines if your doctor has prescribed any.   Have a cholesterol test every 5 years, or more frequently if you are at risk for high cholesterol/heart disease.   Have a diabetes test (fasting glucose) every three years. If you are at risk for diabetes, you should have this test more often.   Have a colonoscopy at age 50, or  have a yearly FIT test (stool test). These exams will check for colon cancer.    Talk with your health care provider about whether or not a prostate cancer screening test (PSA) is right for you.  You should be tested each year for STDs (sexually transmitted diseases), if you re at risk.     Shots: Get a flu shot each year. Get a tetanus shot every 10 years.     Nutrition:  Eat at least 5 servings of fruits and vegetables daily.   Eat whole-grain bread, whole-wheat pasta and brown rice instead of white grains and rice.   Get adequate Calcium and Vitamin D.     Lifestyle  Exercise for at least 150 minutes a week (30 minutes a day, 5 days a week). This will help you control your weight and prevent disease.   Limit alcohol to one drink per day.   No smoking.   Wear sunscreen to prevent skin cancer.   See your dentist every six months for an exam and cleaning.   See your eye doctor every 1 to 2 years.

## 2023-05-08 ENCOUNTER — HOSPITAL ENCOUNTER (OUTPATIENT)
Dept: CARDIOLOGY | Facility: CLINIC | Age: 53
Discharge: HOME | End: 2023-05-08
Attending: INTERNAL MEDICINE | Admitting: INTERNAL MEDICINE
Payer: COMMERCIAL

## 2023-05-08 DIAGNOSIS — Z82.49 FAMILY HISTORY OF HYPERTROPHIC CARDIOMYOPATHY: ICD-10-CM

## 2023-05-08 LAB — 3D LVEF ECHO: NORMAL

## 2023-05-08 PROCEDURE — 93306 TTE W/DOPPLER COMPLETE: CPT

## 2023-05-08 PROCEDURE — 93306 TTE W/DOPPLER COMPLETE: CPT | Mod: 26 | Performed by: INTERNAL MEDICINE

## 2023-05-11 DIAGNOSIS — J45.40 MODERATE PERSISTENT ASTHMA WITHOUT COMPLICATION: ICD-10-CM

## 2023-05-11 RX ORDER — FLUTICASONE PROPIONATE AND SALMETEROL XINAFOATE 115; 21 UG/1; UG/1
AEROSOL, METERED RESPIRATORY (INHALATION)
OUTPATIENT
Start: 2023-05-11

## 2023-05-16 ENCOUNTER — OFFICE VISIT (OUTPATIENT)
Dept: OPTOMETRY | Facility: CLINIC | Age: 53
End: 2023-05-16
Attending: INTERNAL MEDICINE
Payer: COMMERCIAL

## 2023-05-16 DIAGNOSIS — H52.03 HYPEROPIA OF BOTH EYES WITH ASTIGMATISM: Primary | ICD-10-CM

## 2023-05-16 DIAGNOSIS — H52.4 PRESBYOPIA: ICD-10-CM

## 2023-05-16 DIAGNOSIS — H53.9 VISION CHANGES: ICD-10-CM

## 2023-05-16 DIAGNOSIS — H10.13 ALLERGIC CONJUNCTIVITIS, BILATERAL: ICD-10-CM

## 2023-05-16 DIAGNOSIS — H04.123 DRY EYES: ICD-10-CM

## 2023-05-16 DIAGNOSIS — H52.203 HYPEROPIA OF BOTH EYES WITH ASTIGMATISM: Primary | ICD-10-CM

## 2023-05-16 PROCEDURE — 92015 DETERMINE REFRACTIVE STATE: CPT | Performed by: OPTOMETRIST

## 2023-05-16 PROCEDURE — 92004 COMPRE OPH EXAM NEW PT 1/>: CPT | Performed by: OPTOMETRIST

## 2023-05-16 ASSESSMENT — CONF VISUAL FIELD
OS_SUPERIOR_NASAL_RESTRICTION: 0
METHOD: COUNTING FINGERS
OS_INFERIOR_NASAL_RESTRICTION: 0
OS_INFERIOR_TEMPORAL_RESTRICTION: 0
OS_NORMAL: 1
OS_SUPERIOR_TEMPORAL_RESTRICTION: 0
OD_INFERIOR_NASAL_RESTRICTION: 0
OD_SUPERIOR_TEMPORAL_RESTRICTION: 0
OD_NORMAL: 1
OD_INFERIOR_TEMPORAL_RESTRICTION: 0
OD_SUPERIOR_NASAL_RESTRICTION: 0

## 2023-05-16 ASSESSMENT — REFRACTION_MANIFEST
OD_CYLINDER: +0.50
OD_SPHERE: +0.50
OD_SPHERE: +0.25
OS_SPHERE: +0.25
OS_ADD: +2.25
OD_ADD: +2.25
OD_AXIS: 007
OS_AXIS: 165
METHOD_AUTOREFRACTION: 1
OS_SPHERE: +0.50
OD_AXIS: 007
OS_CYLINDER: +0.50
OD_CYLINDER: +0.75
OS_CYLINDER: +0.50
OS_AXIS: 167

## 2023-05-16 ASSESSMENT — VISUAL ACUITY
METHOD: SNELLEN - LINEAR
OD_SC: 20/25
OD_SC: 20/80
OS_SC: 20/80
OS_SC: 20/30

## 2023-05-16 ASSESSMENT — KERATOMETRY
OS_AXISANGLE_DEGREES: 93
OS_K1POWER_DIOPTERS: 43.50
OD_K2POWER_DIOPTERS: 44.25
OD_K1POWER_DIOPTERS: 43.25
OD_AXISANGLE_DEGREES: 43
OS_AXISANGLE2_DEGREES: 3
OD_AXISANGLE2_DEGREES: 133
OS_K2POWER_DIOPTERS: 44

## 2023-05-16 ASSESSMENT — CUP TO DISC RATIO
OS_RATIO: 0.2
OD_RATIO: 0.25

## 2023-05-16 ASSESSMENT — TONOMETRY
OD_IOP_MMHG: 16
IOP_METHOD: APPLANATION
OS_IOP_MMHG: 16

## 2023-05-16 ASSESSMENT — EXTERNAL EXAM - RIGHT EYE: OD_EXAM: NORMAL

## 2023-05-16 ASSESSMENT — EXTERNAL EXAM - LEFT EYE: OS_EXAM: NORMAL

## 2023-05-16 NOTE — PROGRESS NOTES
Chief Complaint   Patient presents with     Annual Eye Exam        Last Eye Exam: 3 years ago  Dilated Previously: No, side effects of dilation explained today    What are you currently using to see?  readers       Distance Vision Acuity: Satisfied with vision unaided     Near Vision Acuity: Satisfied with vision while reading and using computer with readers    Eye Comfort: dry ( more so in winter ) and itchy  Do you use eye drops? : Yes: OTC allergy drops when needed - helps with symptoms for the short term but wants long term       Usha Arce - Optometric Assistant           Medical, surgical and family histories reviewed and updated 5/16/2023.       OBJECTIVE: See Ophthalmology exam    ASSESSMENT:    ICD-10-CM    1. Hyperopia of both eyes with astigmatism  H52.03 REFRACTION    H52.203 EYE EXAM (SIMPLE-NONBILLABLE)      2. Dry eyes  H04.123 Adult Eye  Referral      3. Vision changes  H53.9 Adult Eye  Referral      4. Presbyopia  H52.4 REFRACTION     EYE EXAM (SIMPLE-NONBILLABLE)      5. Allergic conjunctivitis, bilateral  H10.13 EYE EXAM (SIMPLE-NONBILLABLE)          PLAN:     Try pataday, frequent brow, eye wash cold compresses    Artificial tears  Blink/ systane  Discussed spec options   If works I can prescribe     Niyah Rivera OD

## 2023-05-16 NOTE — PATIENT INSTRUCTIONS
Using over the counter Zaditor or Alaway- 1 drop in both eyes 2 x day or Pataday once daily   along with cold compresses and frequent eye/ brow washing will help for itchy, watery eyes.   Using continuously for 2 weeks will have better efficacy    You may also use chilled artificial tears during the day two times daily       DRY EYE TREATMENT    I recommend using artificial tears for your dry eye. There are over the counter drops that work well and may be used up to 4x daily. ( systane balance or ultra, blink, refresh optive, soothe xp) stay away from any red eye relief products such as visine and clear eyes.     If you need more than 4 drops daily, use a preservative free product which come in individual vials and may be used for 24 hours and discarded.   The more severe the dry eye, the more frequent instillation of artificial tears  that are needed to reduce irritation/ inflammation. (Sometimes every 1-2 hours for a couple of days).  You can also add a lubricating ointment in the lower lid at bedtime. ( over the counter refresh pm, genteal gel, lacrilube etc.)    Artificial tears work best as a preventative and not as well after your eyes are starting to bother you and/ or are red.  It may take 4- 6 weeks of using the drops before you notice improvement.  If after that time you are still having problems schedule an appointment for an evaluation to discuss different treatments.    Dry eyes are a chronic condition and you may have more symptoms at certain times of the year.      Additional recommended treatment:  Warm compresses once to twice daily for 5-10 minutes    Directions for warm soaks  There are few methods for hot compresses. Moisten a washcloth with hot water, or microwave for 10 seconds, being careful to not get the cloth too hot.   Then put the washcloth onto your eyelids for 5 minutes. It will cool quickly so a rice pack or eyemask that can be heated and laid on top of the washcloth will help retain  the heat.      Overabundance of bacterial microorganisms along the eyelashes and lid margins induce stress on the tear film and promote inflammation.  Regular lid hygiene helps diminish the bacterial population to prevent inflammation and infection.  Use a warm compress to loosen crusts   Cleanse lids once daily with a lid cleansing product as directed such as Ocusoft or Sterilid which can be purchased at most pharmacies. Diluted baby shampoo will also work, but not as well as it dries the skin and can irritate eyelids.  Hypo chlor spray may also be used on closed lids.      Omega 3 fatty acid supplements taken 1-2x daily  Recommend  at least  2000mg omega 3  800 EPA  600 DHA    Blink regularly  Stay hydrated  Increase humidity  Wear sunglasses   No smoking/ vaping   Replace cosmetics every few months   Avoid direct exposure to forced air--turn air vents in the car away and keep fans from blowing directly on your face

## 2023-05-16 NOTE — LETTER
5/16/2023         RE: Mikel Elias  [address] [address] [facility] [id number]        Dear Colleague,    [facility]. Please see a copy of my visit note below.    Chief Complaint   Patient presents with     Annual Eye Exam        Last Eye Exam: 3 years ago  Dilated Previously: No, side effects of dilation explained today    What are you currently using to see?  readers       Distance Vision Acuity: Satisfied with vision unaided     Near Vision Acuity: Satisfied with vision while reading and using computer with readers    Eye Comfort: dry ( more so in winter ) and itchy  Do you use eye drops? : Yes: OTC allergy drops when needed - helps with symptoms for the short term but wants long term       Usha Arce - Optometric Assistant           Medical, surgical and family histories reviewed and updated 5/16/2023.       OBJECTIVE: See Ophthalmology exam    ASSESSMENT:    ICD-10-CM    1. Hyperopia of both eyes with astigmatism  H52.03 REFRACTION    H52.203 EYE EXAM (SIMPLE-NONBILLABLE)      2. Dry eyes  H04.123 Adult Eye  Referral      3. Vision changes  H53.9 Adult Eye  Referral      4. Presbyopia  H52.4 REFRACTION     EYE EXAM (SIMPLE-NONBILLABLE)      5. Allergic conjunctivitis, bilateral  H10.13 EYE EXAM (SIMPLE-NONBILLABLE)          PLAN:     Try pataday, frequent brow, eye wash cold compresses    Artificial tears  Blink/ systane  Discussed spec options   If works I can prescribe     Niyah Rivera OD         Again, thank you for allowing me to participate in the care of your patient.        Sincerely,        Niyah Rivera, OD

## 2023-12-30 ENCOUNTER — MYC REFILL (OUTPATIENT)
Dept: INTERNAL MEDICINE | Facility: CLINIC | Age: 53
End: 2023-12-30
Payer: COMMERCIAL

## 2023-12-30 DIAGNOSIS — J45.40 MODERATE PERSISTENT ASTHMA WITHOUT COMPLICATION: ICD-10-CM

## 2024-01-03 RX ORDER — FLUTICASONE PROPIONATE AND SALMETEROL XINAFOATE 115; 21 UG/1; UG/1
2 AEROSOL, METERED RESPIRATORY (INHALATION) 2 TIMES DAILY
Qty: 36 G | Refills: 3 | OUTPATIENT
Start: 2024-01-03

## 2024-05-05 ENCOUNTER — HEALTH MAINTENANCE LETTER (OUTPATIENT)
Age: 54
End: 2024-05-05

## 2025-05-17 ENCOUNTER — HEALTH MAINTENANCE LETTER (OUTPATIENT)
Age: 55
End: 2025-05-17